# Patient Record
Sex: MALE | Race: WHITE | Employment: UNEMPLOYED | URBAN - METROPOLITAN AREA
[De-identification: names, ages, dates, MRNs, and addresses within clinical notes are randomized per-mention and may not be internally consistent; named-entity substitution may affect disease eponyms.]

---

## 2017-08-17 ENCOUNTER — HOSPITAL ENCOUNTER (INPATIENT)
Age: 62
LOS: 5 days | Discharge: HOME | DRG: 194 | End: 2017-08-22
Attending: EMERGENCY MEDICINE | Admitting: INTERNAL MEDICINE
Payer: MEDICAID

## 2017-08-17 ENCOUNTER — APPOINTMENT (OUTPATIENT)
Dept: CT IMAGING | Age: 62
DRG: 194 | End: 2017-08-17
Payer: MEDICAID

## 2017-08-17 DIAGNOSIS — J90 PLEURAL EFFUSION: Primary | ICD-10-CM

## 2017-08-17 DIAGNOSIS — I50.9 CONGESTIVE HEART FAILURE, UNSPECIFIED CONGESTIVE HEART FAILURE CHRONICITY, UNSPECIFIED CONGESTIVE HEART FAILURE TYPE: ICD-10-CM

## 2017-08-17 DIAGNOSIS — R06.02 SOB (SHORTNESS OF BREATH): ICD-10-CM

## 2017-08-17 DIAGNOSIS — N18.9 CRF (CHRONIC RENAL FAILURE), UNSPECIFIED STAGE: ICD-10-CM

## 2017-08-17 DIAGNOSIS — R09.02 HYPOXEMIA: ICD-10-CM

## 2017-08-17 LAB
ALBUMIN SERPL-MCNC: 3.6 G/DL (ref 3.9–4.9)
ALP BLD-CCNC: 59 U/L (ref 35–104)
ALT SERPL-CCNC: 12 U/L (ref 0–41)
AMORPHOUS: NORMAL
ANION GAP SERPL CALCULATED.3IONS-SCNC: 12 MEQ/L (ref 7–13)
AST SERPL-CCNC: 16 U/L (ref 0–40)
BASOPHILS ABSOLUTE: 0.1 K/UL (ref 0–0.2)
BASOPHILS RELATIVE PERCENT: 0.6 %
BILIRUB SERPL-MCNC: 0.3 MG/DL (ref 0–1.2)
BILIRUBIN URINE: NEGATIVE
BLOOD, URINE: ABNORMAL
BUN BLDV-MCNC: 42 MG/DL (ref 8–23)
CALCIUM SERPL-MCNC: 8.5 MG/DL (ref 8.6–10.2)
CHLORIDE BLD-SCNC: 102 MEQ/L (ref 98–107)
CLARITY: CLEAR
CO2: 22 MEQ/L (ref 22–29)
COLOR: YELLOW
CREAT SERPL-MCNC: 2.02 MG/DL (ref 0.7–1.2)
CRYSTALS, UA: NORMAL
D DIMER: 2.73 MG/L FEU (ref 0–0.5)
EOSINOPHILS ABSOLUTE: 0.1 K/UL (ref 0–0.7)
EOSINOPHILS RELATIVE PERCENT: 0.9 %
EPITHELIAL CELLS, UA: NORMAL /HPF
GFR AFRICAN AMERICAN: 40.7
GFR NON-AFRICAN AMERICAN: 33.6
GLOBULIN: 2.9 G/DL (ref 2.3–3.5)
GLUCOSE BLD-MCNC: 117 MG/DL (ref 60–115)
GLUCOSE BLD-MCNC: 118 MG/DL (ref 60–115)
GLUCOSE BLD-MCNC: 118 MG/DL (ref 74–109)
GLUCOSE URINE: NEGATIVE MG/DL
HCT VFR BLD CALC: 35 % (ref 42–52)
HEMOGLOBIN: 11 G/DL (ref 14–18)
KETONES, URINE: NEGATIVE MG/DL
LEUKOCYTE ESTERASE, URINE: NEGATIVE
LYMPHOCYTES ABSOLUTE: 1.1 K/UL (ref 1–4.8)
LYMPHOCYTES RELATIVE PERCENT: 8.5 %
MCH RBC QN AUTO: 25.7 PG (ref 27–31.3)
MCHC RBC AUTO-ENTMCNC: 31.3 % (ref 33–37)
MCV RBC AUTO: 82.1 FL (ref 80–100)
MONOCYTES ABSOLUTE: 0.6 K/UL (ref 0.2–0.8)
MONOCYTES RELATIVE PERCENT: 4.7 %
NEUTROPHILS ABSOLUTE: 10.9 K/UL (ref 1.4–6.5)
NEUTROPHILS RELATIVE PERCENT: 85.3 %
NITRITE, URINE: NEGATIVE
PDW BLD-RTO: 17.4 % (ref 11.5–14.5)
PERFORMED ON: ABNORMAL
PERFORMED ON: ABNORMAL
PH UA: 5 (ref 5–9)
PLATELET # BLD: 249 K/UL (ref 130–400)
POC CREATININE WHOLE BLOOD: 2.3
POTASSIUM SERPL-SCNC: 4.9 MEQ/L (ref 3.5–5.1)
PRO-BNP: NORMAL PG/ML
PROTEIN UA: >=300 MG/DL
RBC # BLD: 4.27 M/UL (ref 4.7–6.1)
RBC UA: NORMAL /HPF (ref 0–2)
SODIUM BLD-SCNC: 136 MEQ/L (ref 132–144)
SPECIFIC GRAVITY UA: 1.02 (ref 1–1.03)
TOTAL PROTEIN: 6.5 G/DL (ref 6.4–8.1)
TROPONIN: 0.96 NG/ML (ref 0–0.01)
URINE REFLEX TO CULTURE: YES
UROBILINOGEN, URINE: 0.2 E.U./DL
WBC # BLD: 12.7 K/UL (ref 4.8–10.8)
WBC UA: NORMAL /HPF (ref 0–5)

## 2017-08-17 PROCEDURE — 6360000002 HC RX W HCPCS: Performed by: INTERNAL MEDICINE

## 2017-08-17 PROCEDURE — 85025 COMPLETE CBC W/AUTO DIFF WBC: CPT

## 2017-08-17 PROCEDURE — 82948 REAGENT STRIP/BLOOD GLUCOSE: CPT

## 2017-08-17 PROCEDURE — 6370000000 HC RX 637 (ALT 250 FOR IP): Performed by: INTERNAL MEDICINE

## 2017-08-17 PROCEDURE — 84484 ASSAY OF TROPONIN QUANT: CPT

## 2017-08-17 PROCEDURE — 81001 URINALYSIS AUTO W/SCOPE: CPT

## 2017-08-17 PROCEDURE — 96375 TX/PRO/DX INJ NEW DRUG ADDON: CPT

## 2017-08-17 PROCEDURE — 36600 WITHDRAWAL OF ARTERIAL BLOOD: CPT

## 2017-08-17 PROCEDURE — 93005 ELECTROCARDIOGRAM TRACING: CPT

## 2017-08-17 PROCEDURE — 83880 ASSAY OF NATRIURETIC PEPTIDE: CPT

## 2017-08-17 PROCEDURE — 2060000000 HC ICU INTERMEDIATE R&B

## 2017-08-17 PROCEDURE — 82565 ASSAY OF CREATININE: CPT

## 2017-08-17 PROCEDURE — 36415 COLL VENOUS BLD VENIPUNCTURE: CPT

## 2017-08-17 PROCEDURE — 80053 COMPREHEN METABOLIC PANEL: CPT

## 2017-08-17 PROCEDURE — 83605 ASSAY OF LACTIC ACID: CPT

## 2017-08-17 PROCEDURE — 82803 BLOOD GASES ANY COMBINATION: CPT

## 2017-08-17 PROCEDURE — 87086 URINE CULTURE/COLONY COUNT: CPT

## 2017-08-17 PROCEDURE — 6360000002 HC RX W HCPCS: Performed by: EMERGENCY MEDICINE

## 2017-08-17 PROCEDURE — 85379 FIBRIN DEGRADATION QUANT: CPT

## 2017-08-17 PROCEDURE — 2700000000 HC OXYGEN THERAPY PER DAY

## 2017-08-17 PROCEDURE — 96374 THER/PROPH/DIAG INJ IV PUSH: CPT

## 2017-08-17 PROCEDURE — 99285 EMERGENCY DEPT VISIT HI MDM: CPT

## 2017-08-17 PROCEDURE — 71250 CT THORAX DX C-: CPT

## 2017-08-17 PROCEDURE — 2580000003 HC RX 258: Performed by: INTERNAL MEDICINE

## 2017-08-17 RX ORDER — CARVEDILOL 25 MG/1
25 TABLET ORAL 2 TIMES DAILY WITH MEALS
COMMUNITY

## 2017-08-17 RX ORDER — INSULIN GLARGINE 100 [IU]/ML
30 INJECTION, SOLUTION SUBCUTANEOUS NIGHTLY
COMMUNITY

## 2017-08-17 RX ORDER — DEXTROSE MONOHYDRATE 25 G/50ML
12.5 INJECTION, SOLUTION INTRAVENOUS PRN
Status: DISCONTINUED | OUTPATIENT
Start: 2017-08-17 | End: 2017-08-22 | Stop reason: HOSPADM

## 2017-08-17 RX ORDER — HEPARIN SODIUM 1000 [USP'U]/ML
40 INJECTION, SOLUTION INTRAVENOUS; SUBCUTANEOUS PRN
Status: DISCONTINUED | OUTPATIENT
Start: 2017-08-17 | End: 2017-08-17 | Stop reason: SDUPTHER

## 2017-08-17 RX ORDER — HEPARIN SODIUM 5000 [USP'U]/ML
80 INJECTION, SOLUTION INTRAVENOUS; SUBCUTANEOUS ONCE
Status: COMPLETED | OUTPATIENT
Start: 2017-08-17 | End: 2017-08-17

## 2017-08-17 RX ORDER — SODIUM CHLORIDE 0.9 % (FLUSH) 0.9 %
10 SYRINGE (ML) INJECTION PRN
Status: DISCONTINUED | OUTPATIENT
Start: 2017-08-17 | End: 2017-08-22 | Stop reason: HOSPADM

## 2017-08-17 RX ORDER — HEPARIN SODIUM 5000 [USP'U]/ML
80 INJECTION, SOLUTION INTRAVENOUS; SUBCUTANEOUS PRN
Status: DISCONTINUED | OUTPATIENT
Start: 2017-08-17 | End: 2017-08-22 | Stop reason: HOSPADM

## 2017-08-17 RX ORDER — FUROSEMIDE 10 MG/ML
60 INJECTION INTRAMUSCULAR; INTRAVENOUS ONCE
Status: COMPLETED | OUTPATIENT
Start: 2017-08-17 | End: 2017-08-17

## 2017-08-17 RX ORDER — HEPARIN SODIUM 10000 [USP'U]/100ML
18 INJECTION, SOLUTION INTRAVENOUS CONTINUOUS
Status: DISCONTINUED | OUTPATIENT
Start: 2017-08-17 | End: 2017-08-17 | Stop reason: SDUPTHER

## 2017-08-17 RX ORDER — HEPARIN SODIUM 10000 [USP'U]/100ML
18 INJECTION, SOLUTION INTRAVENOUS CONTINUOUS
Status: DISCONTINUED | OUTPATIENT
Start: 2017-08-17 | End: 2017-08-19

## 2017-08-17 RX ORDER — SODIUM CHLORIDE 0.9 % (FLUSH) 0.9 %
10 SYRINGE (ML) INJECTION EVERY 12 HOURS SCHEDULED
Status: DISCONTINUED | OUTPATIENT
Start: 2017-08-17 | End: 2017-08-22 | Stop reason: HOSPADM

## 2017-08-17 RX ORDER — HEPARIN SODIUM 1000 [USP'U]/ML
80 INJECTION, SOLUTION INTRAVENOUS; SUBCUTANEOUS PRN
Status: DISCONTINUED | OUTPATIENT
Start: 2017-08-17 | End: 2017-08-17 | Stop reason: SDUPTHER

## 2017-08-17 RX ORDER — INSULIN GLARGINE 100 [IU]/ML
30 INJECTION, SOLUTION SUBCUTANEOUS NIGHTLY
Status: DISCONTINUED | OUTPATIENT
Start: 2017-08-17 | End: 2017-08-22 | Stop reason: HOSPADM

## 2017-08-17 RX ORDER — LISINOPRIL 20 MG/1
20 TABLET ORAL DAILY
Status: ON HOLD | COMMUNITY
End: 2017-08-21 | Stop reason: HOSPADM

## 2017-08-17 RX ORDER — ASPIRIN 81 MG/1
81 TABLET, CHEWABLE ORAL DAILY
Status: DISCONTINUED | OUTPATIENT
Start: 2017-08-18 | End: 2017-08-22 | Stop reason: HOSPADM

## 2017-08-17 RX ORDER — HEPARIN SODIUM 5000 [USP'U]/ML
40 INJECTION, SOLUTION INTRAVENOUS; SUBCUTANEOUS PRN
Status: DISCONTINUED | OUTPATIENT
Start: 2017-08-17 | End: 2017-08-22 | Stop reason: HOSPADM

## 2017-08-17 RX ORDER — CARVEDILOL 25 MG/1
25 TABLET ORAL 2 TIMES DAILY WITH MEALS
Status: DISCONTINUED | OUTPATIENT
Start: 2017-08-18 | End: 2017-08-17

## 2017-08-17 RX ORDER — ONDANSETRON 2 MG/ML
4 INJECTION INTRAMUSCULAR; INTRAVENOUS EVERY 6 HOURS PRN
Status: DISCONTINUED | OUTPATIENT
Start: 2017-08-17 | End: 2017-08-22 | Stop reason: HOSPADM

## 2017-08-17 RX ORDER — SODIUM CHLORIDE 9 MG/ML
INJECTION, SOLUTION INTRAVENOUS CONTINUOUS
Status: DISCONTINUED | OUTPATIENT
Start: 2017-08-17 | End: 2017-08-17

## 2017-08-17 RX ORDER — HYDROCODONE BITARTRATE AND ACETAMINOPHEN 5; 325 MG/1; MG/1
1 TABLET ORAL EVERY 4 HOURS PRN
Status: DISCONTINUED | OUTPATIENT
Start: 2017-08-17 | End: 2017-08-22 | Stop reason: HOSPADM

## 2017-08-17 RX ORDER — HEPARIN SODIUM 1000 [USP'U]/ML
80 INJECTION, SOLUTION INTRAVENOUS; SUBCUTANEOUS ONCE
Status: DISCONTINUED | OUTPATIENT
Start: 2017-08-17 | End: 2017-08-17 | Stop reason: SDUPTHER

## 2017-08-17 RX ORDER — ACETAMINOPHEN 325 MG/1
650 TABLET ORAL EVERY 4 HOURS PRN
Status: DISCONTINUED | OUTPATIENT
Start: 2017-08-17 | End: 2017-08-22 | Stop reason: HOSPADM

## 2017-08-17 RX ORDER — ATORVASTATIN CALCIUM 20 MG/1
20 TABLET, FILM COATED ORAL NIGHTLY
Status: DISCONTINUED | OUTPATIENT
Start: 2017-08-17 | End: 2017-08-22 | Stop reason: HOSPADM

## 2017-08-17 RX ORDER — CARVEDILOL 25 MG/1
25 TABLET ORAL 2 TIMES DAILY WITH MEALS
Status: DISCONTINUED | OUTPATIENT
Start: 2017-08-17 | End: 2017-08-22 | Stop reason: HOSPADM

## 2017-08-17 RX ORDER — FAMOTIDINE 20 MG/1
20 TABLET, FILM COATED ORAL 2 TIMES DAILY
Status: DISCONTINUED | OUTPATIENT
Start: 2017-08-17 | End: 2017-08-18

## 2017-08-17 RX ADMIN — FUROSEMIDE 5 MG/HR: 10 INJECTION, SOLUTION INTRAMUSCULAR; INTRAVENOUS at 23:41

## 2017-08-17 RX ADMIN — HEPARIN SODIUM 9600 UNITS: 5000 INJECTION, SOLUTION INTRAVENOUS; SUBCUTANEOUS at 20:12

## 2017-08-17 RX ADMIN — ATORVASTATIN CALCIUM 20 MG: 20 TABLET, FILM COATED ORAL at 22:33

## 2017-08-17 RX ADMIN — HEPARIN SODIUM AND DEXTROSE 18 UNITS/KG/HR: 10000; 5 INJECTION INTRAVENOUS at 20:14

## 2017-08-17 RX ADMIN — CARVEDILOL 25 MG: 25 TABLET, FILM COATED ORAL at 22:34

## 2017-08-17 RX ADMIN — INSULIN GLARGINE 30 UNITS: 100 INJECTION, SOLUTION SUBCUTANEOUS at 23:41

## 2017-08-17 RX ADMIN — FUROSEMIDE 60 MG: 10 INJECTION, SOLUTION INTRAVENOUS at 18:23

## 2017-08-17 ASSESSMENT — ENCOUNTER SYMPTOMS
VOMITING: 0
ABDOMINAL PAIN: 0
SHORTNESS OF BREATH: 1
CHEST TIGHTNESS: 0
EYE PAIN: 0
NAUSEA: 0
SORE THROAT: 0

## 2017-08-18 ENCOUNTER — APPOINTMENT (OUTPATIENT)
Dept: GENERAL RADIOLOGY | Age: 62
DRG: 194 | End: 2017-08-18
Payer: MEDICAID

## 2017-08-18 ENCOUNTER — APPOINTMENT (OUTPATIENT)
Dept: ULTRASOUND IMAGING | Age: 62
DRG: 194 | End: 2017-08-18
Payer: MEDICAID

## 2017-08-18 ENCOUNTER — APPOINTMENT (OUTPATIENT)
Dept: NUCLEAR MEDICINE | Age: 62
DRG: 194 | End: 2017-08-18
Payer: MEDICAID

## 2017-08-18 LAB
AMYLASE FLUID: 23 U/L
APPEARANCE FLUID: CLEAR
APTT: 113.6 SEC (ref 21.6–35.4)
APTT: 61.8 SEC (ref 21.6–35.4)
APTT: 67.1 SEC (ref 21.6–35.4)
BASOPHILS ABSOLUTE: 0 K/UL (ref 0–0.2)
BASOPHILS RELATIVE PERCENT: 0.4 %
CELL COUNT FLUID TYPE: NORMAL
CLOT EVALUATION: NORMAL
COLOR FLUID: YELLOW
D DIMER: 2.33 MG/L FEU (ref 0–0.5)
EOSINOPHILS ABSOLUTE: 0.2 K/UL (ref 0–0.7)
EOSINOPHILS RELATIVE PERCENT: 1.4 %
FLUID TYPE: NORMAL
GFR AFRICAN AMERICAN: 35
GFR NON-AFRICAN AMERICAN: 29
GLUCOSE BLD-MCNC: 109 MG/DL (ref 60–115)
GLUCOSE BLD-MCNC: 125 MG/DL (ref 60–115)
GLUCOSE BLD-MCNC: 214 MG/DL (ref 60–115)
GLUCOSE BLD-MCNC: 270 MG/DL (ref 60–115)
GLUCOSE, FLUID: 149.4 MG/DL
HCT VFR BLD CALC: 31.4 % (ref 42–52)
HEMOGLOBIN: 9.8 G/DL (ref 14–18)
LIPASE BODY FLUID: 7 U/L
LYMPHOCYTES ABSOLUTE: 2 K/UL (ref 1–4.8)
LYMPHOCYTES RELATIVE PERCENT: 16.4 %
MAGNESIUM: 2 MG/DL (ref 1.7–2.3)
MCH RBC QN AUTO: 25.8 PG (ref 27–31.3)
MCHC RBC AUTO-ENTMCNC: 31.3 % (ref 33–37)
MCV RBC AUTO: 82.3 FL (ref 80–100)
MONOCYTES ABSOLUTE: 0.7 K/UL (ref 0.2–0.8)
MONOCYTES RELATIVE PERCENT: 6.2 %
NEUTROPHILS ABSOLUTE: 9.1 K/UL (ref 1.4–6.5)
NEUTROPHILS RELATIVE PERCENT: 75.6 %
NUCLEATED CELLS FLUID: 10 /CUMM
NUMBER OF CELLS COUNTED FLUID: 100
PDW BLD-RTO: 16.8 % (ref 11.5–14.5)
PERFORMED ON: ABNORMAL
PERFORMED ON: NORMAL
PLATELET # BLD: 232 K/UL (ref 130–400)
POC CREATININE: 2.3 MG/DL (ref 0.8–1.3)
POC SAMPLE TYPE: ABNORMAL
PROTEIN FLUID: 3 G/DL
RBC # BLD: 3.82 M/UL (ref 4.7–6.1)
RBC FLUID: 60 /CUMM
TROPONIN: 1.11 NG/ML (ref 0–0.01)
TROPONIN: 1.24 NG/ML (ref 0–0.01)
WBC # BLD: 12 K/UL (ref 4.8–10.8)

## 2017-08-18 PROCEDURE — 99223 1ST HOSP IP/OBS HIGH 75: CPT | Performed by: RADIOLOGY

## 2017-08-18 PROCEDURE — 89051 BODY FLUID CELL COUNT: CPT

## 2017-08-18 PROCEDURE — 36415 COLL VENOUS BLD VENIPUNCTURE: CPT

## 2017-08-18 PROCEDURE — 88305 TISSUE EXAM BY PATHOLOGIST: CPT

## 2017-08-18 PROCEDURE — 32555 ASPIRATE PLEURA W/ IMAGING: CPT

## 2017-08-18 PROCEDURE — 84484 ASSAY OF TROPONIN QUANT: CPT

## 2017-08-18 PROCEDURE — 84157 ASSAY OF PROTEIN OTHER: CPT

## 2017-08-18 PROCEDURE — 2580000003 HC RX 258: Performed by: INTERNAL MEDICINE

## 2017-08-18 PROCEDURE — 85025 COMPLETE CBC W/AUTO DIFF WBC: CPT

## 2017-08-18 PROCEDURE — 32555 ASPIRATE PLEURA W/ IMAGING: CPT | Performed by: RADIOLOGY

## 2017-08-18 PROCEDURE — 82945 GLUCOSE OTHER FLUID: CPT

## 2017-08-18 PROCEDURE — 83735 ASSAY OF MAGNESIUM: CPT

## 2017-08-18 PROCEDURE — 0W993ZZ DRAINAGE OF RIGHT PLEURAL CAVITY, PERCUTANEOUS APPROACH: ICD-10-PCS | Performed by: RADIOLOGY

## 2017-08-18 PROCEDURE — 88112 CYTOPATH CELL ENHANCE TECH: CPT

## 2017-08-18 PROCEDURE — A9540 TC99M MAA: HCPCS | Performed by: INTERNAL MEDICINE

## 2017-08-18 PROCEDURE — 87070 CULTURE OTHR SPECIMN AEROBIC: CPT

## 2017-08-18 PROCEDURE — A9539 TC99M PENTETATE: HCPCS | Performed by: INTERNAL MEDICINE

## 2017-08-18 PROCEDURE — 6370000000 HC RX 637 (ALT 250 FOR IP): Performed by: INTERNAL MEDICINE

## 2017-08-18 PROCEDURE — 85730 THROMBOPLASTIN TIME PARTIAL: CPT

## 2017-08-18 PROCEDURE — 2060000000 HC ICU INTERMEDIATE R&B

## 2017-08-18 PROCEDURE — 85379 FIBRIN DEGRADATION QUANT: CPT

## 2017-08-18 PROCEDURE — 71020 XR CHEST STANDARD TWO VW: CPT

## 2017-08-18 PROCEDURE — 3430000000 HC RX DIAGNOSTIC RADIOPHARMACEUTICAL: Performed by: INTERNAL MEDICINE

## 2017-08-18 PROCEDURE — 82150 ASSAY OF AMYLASE: CPT

## 2017-08-18 PROCEDURE — 78582 LUNG VENTILAT&PERFUS IMAGING: CPT

## 2017-08-18 PROCEDURE — 2500000003 HC RX 250 WO HCPCS: Performed by: RADIOLOGY

## 2017-08-18 PROCEDURE — 87205 SMEAR GRAM STAIN: CPT

## 2017-08-18 PROCEDURE — 71020 XR CHEST STANDARD TWO VW: CPT | Performed by: RADIOLOGY

## 2017-08-18 PROCEDURE — 83690 ASSAY OF LIPASE: CPT

## 2017-08-18 RX ORDER — SODIUM CHLORIDE 0.9 % (FLUSH) 0.9 %
10 SYRINGE (ML) INJECTION PRN
Status: DISCONTINUED | OUTPATIENT
Start: 2017-08-18 | End: 2017-08-22 | Stop reason: HOSPADM

## 2017-08-18 RX ORDER — LISINOPRIL 10 MG/1
10 TABLET ORAL DAILY
Status: DISCONTINUED | OUTPATIENT
Start: 2017-08-18 | End: 2017-08-20

## 2017-08-18 RX ORDER — LIDOCAINE HYDROCHLORIDE 20 MG/ML
INJECTION, SOLUTION INFILTRATION; PERINEURAL
Status: COMPLETED | OUTPATIENT
Start: 2017-08-18 | End: 2017-08-18

## 2017-08-18 RX ORDER — FAMOTIDINE 20 MG/1
20 TABLET, FILM COATED ORAL DAILY
Status: DISCONTINUED | OUTPATIENT
Start: 2017-08-19 | End: 2017-08-22 | Stop reason: HOSPADM

## 2017-08-18 RX ORDER — KIT FOR THE PREPARATION OF TECHNETIUM TC 99M PENTETATE 20 MG/1
1 INJECTION, POWDER, LYOPHILIZED, FOR SOLUTION INTRAVENOUS; RESPIRATORY (INHALATION)
Status: COMPLETED | OUTPATIENT
Start: 2017-08-18 | End: 2017-08-18

## 2017-08-18 RX ADMIN — LISINOPRIL 10 MG: 10 TABLET ORAL at 21:37

## 2017-08-18 RX ADMIN — INSULIN GLARGINE 30 UNITS: 100 INJECTION, SOLUTION SUBCUTANEOUS at 21:33

## 2017-08-18 RX ADMIN — KIT FOR THE PREPARATION OF TECHNETIUM TC 99M PENTETATE 1 MILLICURIE: 20 INJECTION, POWDER, LYOPHILIZED, FOR SOLUTION INTRAVENOUS; RESPIRATORY (INHALATION) at 08:20

## 2017-08-18 RX ADMIN — FAMOTIDINE 20 MG: 20 TABLET, FILM COATED ORAL at 00:01

## 2017-08-18 RX ADMIN — LISINOPRIL 10 MG: 10 TABLET ORAL at 17:15

## 2017-08-18 RX ADMIN — CARVEDILOL 25 MG: 25 TABLET, FILM COATED ORAL at 10:00

## 2017-08-18 RX ADMIN — ASPIRIN 81 MG 81 MG: 81 TABLET ORAL at 10:00

## 2017-08-18 RX ADMIN — CARVEDILOL 25 MG: 25 TABLET, FILM COATED ORAL at 21:37

## 2017-08-18 RX ADMIN — CARVEDILOL 25 MG: 25 TABLET, FILM COATED ORAL at 17:15

## 2017-08-18 RX ADMIN — LIDOCAINE HYDROCHLORIDE 5 ML: 20 INJECTION, SOLUTION INFILTRATION; PERINEURAL at 16:13

## 2017-08-18 RX ADMIN — Medication 10 ML: at 08:40

## 2017-08-18 RX ADMIN — SODIUM CHLORIDE, PRESERVATIVE FREE 10 ML: 5 INJECTION INTRAVENOUS at 21:31

## 2017-08-18 RX ADMIN — Medication 5.6 MILLICURIE: at 08:40

## 2017-08-18 ASSESSMENT — ENCOUNTER SYMPTOMS
HEMOPTYSIS: 0
EYES NEGATIVE: 1
BACK PAIN: 0
COUGH: 1
DIARRHEA: 0
GASTROINTESTINAL NEGATIVE: 1
BLURRED VISION: 0
VOMITING: 0
SPUTUM PRODUCTION: 0
DOUBLE VISION: 0
PHOTOPHOBIA: 0
WHEEZING: 1
NAUSEA: 0
HEARTBURN: 0
SHORTNESS OF BREATH: 1
COUGH: 0
CONSTIPATION: 0
ABDOMINAL PAIN: 0

## 2017-08-18 ASSESSMENT — PAIN SCALES - GENERAL
PAINLEVEL_OUTOF10: 0
PAINLEVEL_OUTOF10: 0

## 2017-08-19 ENCOUNTER — APPOINTMENT (OUTPATIENT)
Dept: GENERAL RADIOLOGY | Age: 62
DRG: 194 | End: 2017-08-19
Payer: MEDICAID

## 2017-08-19 LAB
ANION GAP SERPL CALCULATED.3IONS-SCNC: 18 MEQ/L (ref 7–13)
APTT: 48.8 SEC (ref 21.6–35.4)
APTT: 54.6 SEC (ref 21.6–35.4)
BASE EXCESS ARTERIAL: -1 (ref -3–3)
BASOPHILS ABSOLUTE: 0.1 K/UL (ref 0–0.2)
BASOPHILS RELATIVE PERCENT: 0.7 %
BUN BLDV-MCNC: 45 MG/DL (ref 8–23)
C-REACTIVE PROTEIN, HIGH SENSITIVITY: 70.3 MG/L (ref 0–5)
CALCIUM SERPL-MCNC: 8.6 MG/DL (ref 8.6–10.2)
CHLORIDE BLD-SCNC: 99 MEQ/L (ref 98–107)
CO2: 25 MEQ/L (ref 22–29)
CREAT SERPL-MCNC: 2.08 MG/DL (ref 0.7–1.2)
EOSINOPHILS ABSOLUTE: 0.1 K/UL (ref 0–0.7)
EOSINOPHILS RELATIVE PERCENT: 1.5 %
FERRITIN: 752.9 NG/ML (ref 30–400)
GFR AFRICAN AMERICAN: 39.3
GFR NON-AFRICAN AMERICAN: 32.5
GLUCOSE BLD-MCNC: 106 MG/DL (ref 74–109)
GLUCOSE BLD-MCNC: 113 MG/DL (ref 60–115)
GLUCOSE BLD-MCNC: 188 MG/DL (ref 60–115)
GLUCOSE BLD-MCNC: 205 MG/DL (ref 60–115)
GLUCOSE BLD-MCNC: 242 MG/DL (ref 60–115)
GRAM STAIN RESULT: NORMAL
HCO3 ARTERIAL: 24.9 MMOL/L (ref 21–29)
HCT VFR BLD CALC: 34.5 % (ref 42–52)
HEMOGLOBIN: 11.1 G/DL (ref 14–18)
IRON SATURATION: 13 % (ref 11–46)
IRON: 34 UG/DL (ref 59–158)
LACTATE: <0.3 MMOL/L (ref 0.4–2)
LYMPHOCYTES ABSOLUTE: 1.4 K/UL (ref 1–4.8)
LYMPHOCYTES RELATIVE PERCENT: 14 %
MAGNESIUM: 1.9 MG/DL (ref 1.7–2.3)
MCH RBC QN AUTO: 26.1 PG (ref 27–31.3)
MCHC RBC AUTO-ENTMCNC: 32.2 % (ref 33–37)
MCV RBC AUTO: 81 FL (ref 80–100)
MONOCYTES ABSOLUTE: 0.5 K/UL (ref 0.2–0.8)
MONOCYTES RELATIVE PERCENT: 5.6 %
NEUTROPHILS ABSOLUTE: 7.6 K/UL (ref 1.4–6.5)
NEUTROPHILS RELATIVE PERCENT: 78.2 %
O2 SAT, ARTERIAL: 87 % (ref 93–100)
PCO2 ARTERIAL: 47 MM HG (ref 35–45)
PDW BLD-RTO: 17.3 % (ref 11.5–14.5)
PERFORMED ON: ABNORMAL
PERFORMED ON: NORMAL
PH ARTERIAL: 7.33 (ref 7.35–7.45)
PLATELET # BLD: 235 K/UL (ref 130–400)
PO2 ARTERIAL: 57 MM HG (ref 75–108)
POC SAMPLE TYPE: ABNORMAL
POTASSIUM SERPL-SCNC: 4.1 MEQ/L (ref 3.5–5.1)
RBC # BLD: 4.25 M/UL (ref 4.7–6.1)
SEDIMENTATION RATE, ERYTHROCYTE: 90 MM (ref 0–20)
SODIUM BLD-SCNC: 142 MEQ/L (ref 132–144)
TCO2 ARTERIAL: 26 (ref 22–29)
TOTAL IRON BINDING CAPACITY: 257 UG/DL (ref 178–450)
TROPONIN: 1.11 NG/ML (ref 0–0.01)
URINE CULTURE, ROUTINE: NORMAL
WBC # BLD: 9.7 K/UL (ref 4.8–10.8)

## 2017-08-19 PROCEDURE — 87040 BLOOD CULTURE FOR BACTERIA: CPT

## 2017-08-19 PROCEDURE — 2580000003 HC RX 258: Performed by: INTERNAL MEDICINE

## 2017-08-19 PROCEDURE — 6370000000 HC RX 637 (ALT 250 FOR IP): Performed by: INTERNAL MEDICINE

## 2017-08-19 PROCEDURE — 93005 ELECTROCARDIOGRAM TRACING: CPT

## 2017-08-19 PROCEDURE — 6360000002 HC RX W HCPCS: Performed by: INTERNAL MEDICINE

## 2017-08-19 PROCEDURE — 2700000000 HC OXYGEN THERAPY PER DAY

## 2017-08-19 PROCEDURE — 85652 RBC SED RATE AUTOMATED: CPT

## 2017-08-19 PROCEDURE — 83540 ASSAY OF IRON: CPT

## 2017-08-19 PROCEDURE — 85730 THROMBOPLASTIN TIME PARTIAL: CPT

## 2017-08-19 PROCEDURE — 80048 BASIC METABOLIC PNL TOTAL CA: CPT

## 2017-08-19 PROCEDURE — 6360000002 HC RX W HCPCS: Performed by: EMERGENCY MEDICINE

## 2017-08-19 PROCEDURE — 2060000000 HC ICU INTERMEDIATE R&B

## 2017-08-19 PROCEDURE — 85025 COMPLETE CBC W/AUTO DIFF WBC: CPT

## 2017-08-19 PROCEDURE — 82728 ASSAY OF FERRITIN: CPT

## 2017-08-19 PROCEDURE — 71020 XR CHEST STANDARD TWO VW: CPT

## 2017-08-19 PROCEDURE — 36415 COLL VENOUS BLD VENIPUNCTURE: CPT

## 2017-08-19 PROCEDURE — 83550 IRON BINDING TEST: CPT

## 2017-08-19 PROCEDURE — 83735 ASSAY OF MAGNESIUM: CPT

## 2017-08-19 PROCEDURE — 86141 C-REACTIVE PROTEIN HS: CPT

## 2017-08-19 PROCEDURE — 99232 SBSQ HOSP IP/OBS MODERATE 35: CPT | Performed by: INTERNAL MEDICINE

## 2017-08-19 PROCEDURE — 84484 ASSAY OF TROPONIN QUANT: CPT

## 2017-08-19 PROCEDURE — 93017 CV STRESS TEST TRACING ONLY: CPT

## 2017-08-19 PROCEDURE — 93010 ELECTROCARDIOGRAM REPORT: CPT | Performed by: INTERNAL MEDICINE

## 2017-08-19 RX ORDER — HYDRALAZINE HYDROCHLORIDE 25 MG/1
25 TABLET, FILM COATED ORAL EVERY 8 HOURS SCHEDULED
Status: DISCONTINUED | OUTPATIENT
Start: 2017-08-19 | End: 2017-08-21

## 2017-08-19 RX ADMIN — HYDRALAZINE HYDROCHLORIDE 25 MG: 25 TABLET, FILM COATED ORAL at 14:19

## 2017-08-19 RX ADMIN — INSULIN GLARGINE 30 UNITS: 100 INJECTION, SOLUTION SUBCUTANEOUS at 21:07

## 2017-08-19 RX ADMIN — LISINOPRIL 10 MG: 10 TABLET ORAL at 09:06

## 2017-08-19 RX ADMIN — CARVEDILOL 25 MG: 25 TABLET, FILM COATED ORAL at 09:06

## 2017-08-19 RX ADMIN — HEPARIN SODIUM AND DEXTROSE 14.97 UNITS/KG/HR: 10000; 5 INJECTION INTRAVENOUS at 09:08

## 2017-08-19 RX ADMIN — CARVEDILOL 25 MG: 25 TABLET, FILM COATED ORAL at 17:26

## 2017-08-19 RX ADMIN — ASPIRIN 81 MG 81 MG: 81 TABLET ORAL at 09:06

## 2017-08-19 RX ADMIN — FUROSEMIDE 10 MG/HR: 10 INJECTION, SOLUTION INTRAMUSCULAR; INTRAVENOUS at 23:29

## 2017-08-19 ASSESSMENT — PAIN SCALES - GENERAL
PAINLEVEL_OUTOF10: 0

## 2017-08-20 ENCOUNTER — APPOINTMENT (OUTPATIENT)
Dept: GENERAL RADIOLOGY | Age: 62
DRG: 194 | End: 2017-08-20
Payer: MEDICAID

## 2017-08-20 LAB
ANION GAP SERPL CALCULATED.3IONS-SCNC: 16 MEQ/L (ref 7–13)
APTT: 35.8 SEC (ref 21.6–35.4)
BASOPHILS ABSOLUTE: 0 K/UL (ref 0–0.2)
BASOPHILS RELATIVE PERCENT: 0.6 %
BUN BLDV-MCNC: 52 MG/DL (ref 8–23)
CALCIUM SERPL-MCNC: 8 MG/DL (ref 8.6–10.2)
CHLORIDE BLD-SCNC: 99 MEQ/L (ref 98–107)
CO2: 27 MEQ/L (ref 22–29)
CREAT SERPL-MCNC: 2.45 MG/DL (ref 0.7–1.2)
CREATININE URINE: 41.5 MG/DL
EOSINOPHILS ABSOLUTE: 0.3 K/UL (ref 0–0.7)
EOSINOPHILS RELATIVE PERCENT: 3.4 %
GFR AFRICAN AMERICAN: 32.6
GFR NON-AFRICAN AMERICAN: 26.9
GLUCOSE BLD-MCNC: 189 MG/DL (ref 60–115)
GLUCOSE BLD-MCNC: 224 MG/DL (ref 60–115)
GLUCOSE BLD-MCNC: 274 MG/DL (ref 60–115)
GLUCOSE BLD-MCNC: 59 MG/DL (ref 74–109)
GLUCOSE BLD-MCNC: 71 MG/DL (ref 60–115)
HCT VFR BLD CALC: 30.5 % (ref 42–52)
HEMOGLOBIN: 9.9 G/DL (ref 14–18)
LYMPHOCYTES ABSOLUTE: 1.6 K/UL (ref 1–4.8)
LYMPHOCYTES RELATIVE PERCENT: 19.8 %
MCH RBC QN AUTO: 26.1 PG (ref 27–31.3)
MCHC RBC AUTO-ENTMCNC: 32.5 % (ref 33–37)
MCV RBC AUTO: 80.3 FL (ref 80–100)
MONOCYTES ABSOLUTE: 0.6 K/UL (ref 0.2–0.8)
MONOCYTES RELATIVE PERCENT: 7.2 %
NEUTROPHILS ABSOLUTE: 5.7 K/UL (ref 1.4–6.5)
NEUTROPHILS RELATIVE PERCENT: 69 %
PDW BLD-RTO: 17.1 % (ref 11.5–14.5)
PERFORMED ON: ABNORMAL
PERFORMED ON: NORMAL
PLATELET # BLD: 230 K/UL (ref 130–400)
POTASSIUM SERPL-SCNC: 3.6 MEQ/L (ref 3.5–5.1)
PROTEIN PROTEIN: 28 MG/DL
PROTEIN/CREAT RATIO: 0.7 ML/ML (ref 0–0.2)
RBC # BLD: 3.8 M/UL (ref 4.7–6.1)
SODIUM BLD-SCNC: 142 MEQ/L (ref 132–144)
WBC # BLD: 8.3 K/UL (ref 4.8–10.8)

## 2017-08-20 PROCEDURE — 6370000000 HC RX 637 (ALT 250 FOR IP): Performed by: INTERNAL MEDICINE

## 2017-08-20 PROCEDURE — 2580000003 HC RX 258: Performed by: INTERNAL MEDICINE

## 2017-08-20 PROCEDURE — 2700000000 HC OXYGEN THERAPY PER DAY

## 2017-08-20 PROCEDURE — 84156 ASSAY OF PROTEIN URINE: CPT

## 2017-08-20 PROCEDURE — 85730 THROMBOPLASTIN TIME PARTIAL: CPT

## 2017-08-20 PROCEDURE — 36415 COLL VENOUS BLD VENIPUNCTURE: CPT

## 2017-08-20 PROCEDURE — 2060000000 HC ICU INTERMEDIATE R&B

## 2017-08-20 PROCEDURE — 71020 XR CHEST STANDARD TWO VW: CPT

## 2017-08-20 PROCEDURE — 80048 BASIC METABOLIC PNL TOTAL CA: CPT

## 2017-08-20 PROCEDURE — 85025 COMPLETE CBC W/AUTO DIFF WBC: CPT

## 2017-08-20 RX ADMIN — SODIUM CHLORIDE, PRESERVATIVE FREE 10 ML: 5 INJECTION INTRAVENOUS at 21:16

## 2017-08-20 RX ADMIN — HYDRALAZINE HYDROCHLORIDE 25 MG: 25 TABLET, FILM COATED ORAL at 17:31

## 2017-08-20 RX ADMIN — INSULIN GLARGINE 30 UNITS: 100 INJECTION, SOLUTION SUBCUTANEOUS at 21:15

## 2017-08-20 RX ADMIN — SODIUM CHLORIDE, PRESERVATIVE FREE 10 ML: 5 INJECTION INTRAVENOUS at 08:23

## 2017-08-20 RX ADMIN — LISINOPRIL 10 MG: 10 TABLET ORAL at 08:22

## 2017-08-20 RX ADMIN — ASPIRIN 81 MG 81 MG: 81 TABLET ORAL at 08:23

## 2017-08-20 RX ADMIN — CARVEDILOL 25 MG: 25 TABLET, FILM COATED ORAL at 17:31

## 2017-08-20 RX ADMIN — HYDRALAZINE HYDROCHLORIDE 25 MG: 25 TABLET, FILM COATED ORAL at 08:22

## 2017-08-20 RX ADMIN — CARVEDILOL 25 MG: 25 TABLET, FILM COATED ORAL at 08:22

## 2017-08-20 ASSESSMENT — PAIN SCALES - GENERAL
PAINLEVEL_OUTOF10: 0

## 2017-08-21 ENCOUNTER — APPOINTMENT (OUTPATIENT)
Dept: NUCLEAR MEDICINE | Age: 62
DRG: 194 | End: 2017-08-21
Payer: MEDICAID

## 2017-08-21 LAB
ANION GAP SERPL CALCULATED.3IONS-SCNC: 16 MEQ/L (ref 7–13)
APTT: 33.7 SEC (ref 21.6–35.4)
BASOPHILS ABSOLUTE: 0 K/UL (ref 0–0.2)
BASOPHILS RELATIVE PERCENT: 0.6 %
BUN BLDV-MCNC: 52 MG/DL (ref 8–23)
CALCIUM SERPL-MCNC: 7.7 MG/DL (ref 8.6–10.2)
CHLORIDE BLD-SCNC: 99 MEQ/L (ref 98–107)
CO2: 27 MEQ/L (ref 22–29)
CREAT SERPL-MCNC: 2.27 MG/DL (ref 0.7–1.2)
EOSINOPHILS ABSOLUTE: 0.3 K/UL (ref 0–0.7)
EOSINOPHILS RELATIVE PERCENT: 3.9 %
GFR AFRICAN AMERICAN: 35.6
GFR NON-AFRICAN AMERICAN: 29.4
GLUCOSE BLD-MCNC: 104 MG/DL (ref 74–109)
GLUCOSE BLD-MCNC: 137 MG/DL (ref 60–115)
GLUCOSE BLD-MCNC: 186 MG/DL (ref 60–115)
GLUCOSE BLD-MCNC: 217 MG/DL (ref 60–115)
GLUCOSE BLD-MCNC: 93 MG/DL (ref 60–115)
HCT VFR BLD CALC: 31.1 % (ref 42–52)
HEMOGLOBIN: 9.9 G/DL (ref 14–18)
LYMPHOCYTES ABSOLUTE: 1.9 K/UL (ref 1–4.8)
LYMPHOCYTES RELATIVE PERCENT: 23 %
MCH RBC QN AUTO: 25.8 PG (ref 27–31.3)
MCHC RBC AUTO-ENTMCNC: 31.7 % (ref 33–37)
MCV RBC AUTO: 81.4 FL (ref 80–100)
MONOCYTES ABSOLUTE: 0.7 K/UL (ref 0.2–0.8)
MONOCYTES RELATIVE PERCENT: 9.1 %
NEUTROPHILS ABSOLUTE: 5.2 K/UL (ref 1.4–6.5)
NEUTROPHILS RELATIVE PERCENT: 63.4 %
PDW BLD-RTO: 16.8 % (ref 11.5–14.5)
PERFORMED ON: ABNORMAL
PERFORMED ON: NORMAL
PLATELET # BLD: 213 K/UL (ref 130–400)
POTASSIUM SERPL-SCNC: 3.8 MEQ/L (ref 3.5–5.1)
RBC # BLD: 3.82 M/UL (ref 4.7–6.1)
SODIUM BLD-SCNC: 142 MEQ/L (ref 132–144)
WBC # BLD: 8.2 K/UL (ref 4.8–10.8)

## 2017-08-21 PROCEDURE — G8987 SELF CARE CURRENT STATUS: HCPCS

## 2017-08-21 PROCEDURE — 2580000003 HC RX 258: Performed by: INTERNAL MEDICINE

## 2017-08-21 PROCEDURE — G8988 SELF CARE GOAL STATUS: HCPCS

## 2017-08-21 PROCEDURE — 36415 COLL VENOUS BLD VENIPUNCTURE: CPT

## 2017-08-21 PROCEDURE — 6370000000 HC RX 637 (ALT 250 FOR IP): Performed by: INTERNAL MEDICINE

## 2017-08-21 PROCEDURE — 85730 THROMBOPLASTIN TIME PARTIAL: CPT

## 2017-08-21 PROCEDURE — 80048 BASIC METABOLIC PNL TOTAL CA: CPT

## 2017-08-21 PROCEDURE — G8989 SELF CARE D/C STATUS: HCPCS

## 2017-08-21 PROCEDURE — 3430000000 HC RX DIAGNOSTIC RADIOPHARMACEUTICAL: Performed by: INTERNAL MEDICINE

## 2017-08-21 PROCEDURE — 93010 ELECTROCARDIOGRAM REPORT: CPT | Performed by: INTERNAL MEDICINE

## 2017-08-21 PROCEDURE — A9502 TC99M TETROFOSMIN: HCPCS | Performed by: INTERNAL MEDICINE

## 2017-08-21 PROCEDURE — 99232 SBSQ HOSP IP/OBS MODERATE 35: CPT | Performed by: INTERNAL MEDICINE

## 2017-08-21 PROCEDURE — 2060000000 HC ICU INTERMEDIATE R&B

## 2017-08-21 PROCEDURE — 97165 OT EVAL LOW COMPLEX 30 MIN: CPT

## 2017-08-21 PROCEDURE — 78453 HT MUSCLE IMAGE PLANAR SING: CPT

## 2017-08-21 PROCEDURE — 85025 COMPLETE CBC W/AUTO DIFF WBC: CPT

## 2017-08-21 RX ORDER — TORSEMIDE 20 MG/1
20 TABLET ORAL DAILY
Status: DISCONTINUED | OUTPATIENT
Start: 2017-08-21 | End: 2017-08-22 | Stop reason: HOSPADM

## 2017-08-21 RX ORDER — LISINOPRIL 10 MG/1
10 TABLET ORAL DAILY
Qty: 30 TABLET | Refills: 3 | Status: SHIPPED | OUTPATIENT
Start: 2017-08-21

## 2017-08-21 RX ORDER — ISOSORBIDE MONONITRATE 60 MG/1
60 TABLET, EXTENDED RELEASE ORAL DAILY
Status: DISCONTINUED | OUTPATIENT
Start: 2017-08-21 | End: 2017-08-22 | Stop reason: HOSPADM

## 2017-08-21 RX ORDER — TORSEMIDE 20 MG/1
20 TABLET ORAL DAILY
Qty: 30 TABLET | Refills: 3 | Status: SHIPPED | OUTPATIENT
Start: 2017-08-21

## 2017-08-21 RX ORDER — LISINOPRIL 10 MG/1
10 TABLET ORAL DAILY
Status: DISCONTINUED | OUTPATIENT
Start: 2017-08-21 | End: 2017-08-22 | Stop reason: HOSPADM

## 2017-08-21 RX ORDER — SODIUM CHLORIDE 0.9 % (FLUSH) 0.9 %
10 SYRINGE (ML) INJECTION PRN
Status: DISCONTINUED | OUTPATIENT
Start: 2017-08-21 | End: 2017-08-22 | Stop reason: HOSPADM

## 2017-08-21 RX ORDER — HYDRALAZINE HYDROCHLORIDE 50 MG/1
50 TABLET, FILM COATED ORAL 2 TIMES DAILY
Status: DISCONTINUED | OUTPATIENT
Start: 2017-08-21 | End: 2017-08-22 | Stop reason: HOSPADM

## 2017-08-21 RX ADMIN — INSULIN GLARGINE 30 UNITS: 100 INJECTION, SOLUTION SUBCUTANEOUS at 22:25

## 2017-08-21 RX ADMIN — ISOSORBIDE MONONITRATE 60 MG: 60 TABLET, EXTENDED RELEASE ORAL at 14:57

## 2017-08-21 RX ADMIN — Medication 10 ML: at 12:05

## 2017-08-21 RX ADMIN — HYDRALAZINE HYDROCHLORIDE 25 MG: 25 TABLET, FILM COATED ORAL at 05:27

## 2017-08-21 RX ADMIN — LISINOPRIL 10 MG: 10 TABLET ORAL at 14:57

## 2017-08-21 RX ADMIN — CARVEDILOL 25 MG: 25 TABLET, FILM COATED ORAL at 17:18

## 2017-08-21 RX ADMIN — TETROFOSMIN 35.6 MILLICURIE: 0.23 INJECTION, POWDER, LYOPHILIZED, FOR SOLUTION INTRAVENOUS at 12:05

## 2017-08-21 RX ADMIN — SODIUM CHLORIDE, PRESERVATIVE FREE 10 ML: 5 INJECTION INTRAVENOUS at 22:06

## 2017-08-21 RX ADMIN — HYDRALAZINE HYDROCHLORIDE 50 MG: 50 TABLET ORAL at 22:03

## 2017-08-21 RX ADMIN — CARVEDILOL 25 MG: 25 TABLET, FILM COATED ORAL at 09:54

## 2017-08-21 RX ADMIN — ASPIRIN 81 MG 81 MG: 81 TABLET ORAL at 09:54

## 2017-08-21 RX ADMIN — SODIUM CHLORIDE, PRESERVATIVE FREE 10 ML: 5 INJECTION INTRAVENOUS at 09:55

## 2017-08-21 RX ADMIN — TORSEMIDE 20 MG: 20 TABLET ORAL at 14:56

## 2017-08-21 ASSESSMENT — PAIN SCALES - GENERAL
PAINLEVEL_OUTOF10: 0

## 2017-08-22 ENCOUNTER — APPOINTMENT (OUTPATIENT)
Dept: GENERAL RADIOLOGY | Age: 62
DRG: 194 | End: 2017-08-22
Payer: MEDICAID

## 2017-08-22 VITALS
BODY MASS INDEX: 37.94 KG/M2 | HEART RATE: 67 BPM | TEMPERATURE: 97.9 F | WEIGHT: 265 LBS | DIASTOLIC BLOOD PRESSURE: 71 MMHG | RESPIRATION RATE: 18 BRPM | HEIGHT: 70 IN | OXYGEN SATURATION: 97 % | SYSTOLIC BLOOD PRESSURE: 148 MMHG

## 2017-08-22 PROBLEM — E11.9 DM (DIABETES MELLITUS) (HCC): Status: ACTIVE | Noted: 2017-08-22

## 2017-08-22 PROBLEM — I10 HTN (HYPERTENSION): Status: ACTIVE | Noted: 2017-08-22

## 2017-08-22 PROBLEM — R06.00 DYSPNEA: Status: ACTIVE | Noted: 2017-08-22

## 2017-08-22 PROBLEM — I50.9 CHF (CONGESTIVE HEART FAILURE) (HCC): Status: ACTIVE | Noted: 2017-08-22

## 2017-08-22 LAB
ANION GAP SERPL CALCULATED.3IONS-SCNC: 16 MEQ/L (ref 7–13)
APTT: 33.1 SEC (ref 21.6–35.4)
BODY FLUID CULTURE, STERILE: NORMAL
BUN BLDV-MCNC: 51 MG/DL (ref 8–23)
CALCIUM SERPL-MCNC: 7.8 MG/DL (ref 8.6–10.2)
CHLORIDE BLD-SCNC: 104 MEQ/L (ref 98–107)
CK MB: 2.9 NG/ML (ref 0–6.7)
CO2: 25 MEQ/L (ref 22–29)
CREAT SERPL-MCNC: 2.01 MG/DL (ref 0.7–1.2)
CREATINE KINASE-MB INDEX: 2.2 % (ref 0–3.5)
GFR AFRICAN AMERICAN: 40.9
GFR NON-AFRICAN AMERICAN: 33.8
GLUCOSE BLD-MCNC: 113 MG/DL (ref 74–109)
GLUCOSE BLD-MCNC: 119 MG/DL (ref 60–115)
GLUCOSE BLD-MCNC: 144 MG/DL (ref 60–115)
PERFORMED ON: ABNORMAL
PERFORMED ON: ABNORMAL
POTASSIUM SERPL-SCNC: 4.1 MEQ/L (ref 3.5–5.1)
SODIUM BLD-SCNC: 145 MEQ/L (ref 132–144)
TOTAL CK: 133 U/L (ref 0–190)
TROPONIN: 0.15 NG/ML (ref 0–0.01)

## 2017-08-22 PROCEDURE — 80048 BASIC METABOLIC PNL TOTAL CA: CPT

## 2017-08-22 PROCEDURE — 71020 XR CHEST STANDARD TWO VW: CPT

## 2017-08-22 PROCEDURE — 6370000000 HC RX 637 (ALT 250 FOR IP): Performed by: INTERNAL MEDICINE

## 2017-08-22 PROCEDURE — 84484 ASSAY OF TROPONIN QUANT: CPT

## 2017-08-22 PROCEDURE — 82553 CREATINE MB FRACTION: CPT

## 2017-08-22 PROCEDURE — 93005 ELECTROCARDIOGRAM TRACING: CPT

## 2017-08-22 PROCEDURE — 36415 COLL VENOUS BLD VENIPUNCTURE: CPT

## 2017-08-22 PROCEDURE — 2580000003 HC RX 258: Performed by: INTERNAL MEDICINE

## 2017-08-22 PROCEDURE — 99232 SBSQ HOSP IP/OBS MODERATE 35: CPT | Performed by: INTERNAL MEDICINE

## 2017-08-22 PROCEDURE — 82550 ASSAY OF CK (CPK): CPT

## 2017-08-22 PROCEDURE — 85730 THROMBOPLASTIN TIME PARTIAL: CPT

## 2017-08-22 RX ORDER — CLOPIDOGREL BISULFATE 75 MG/1
75 TABLET ORAL DAILY
Status: DISCONTINUED | OUTPATIENT
Start: 2017-08-22 | End: 2017-08-22 | Stop reason: HOSPADM

## 2017-08-22 RX ORDER — ATORVASTATIN CALCIUM 20 MG/1
20 TABLET, FILM COATED ORAL NIGHTLY
Qty: 30 TABLET | Refills: 3 | Status: SHIPPED | OUTPATIENT
Start: 2017-08-22

## 2017-08-22 RX ORDER — CLOPIDOGREL BISULFATE 75 MG/1
75 TABLET ORAL DAILY
Qty: 30 TABLET | Refills: 3 | Status: SHIPPED | OUTPATIENT
Start: 2017-08-22

## 2017-08-22 RX ORDER — ASPIRIN 81 MG/1
81 TABLET, CHEWABLE ORAL DAILY
Qty: 30 TABLET | Refills: 3 | Status: SHIPPED | OUTPATIENT
Start: 2017-08-22

## 2017-08-22 RX ADMIN — CARVEDILOL 25 MG: 25 TABLET, FILM COATED ORAL at 08:58

## 2017-08-22 RX ADMIN — ISOSORBIDE MONONITRATE 60 MG: 60 TABLET, EXTENDED RELEASE ORAL at 08:58

## 2017-08-22 RX ADMIN — LISINOPRIL 10 MG: 10 TABLET ORAL at 08:58

## 2017-08-22 RX ADMIN — HYDRALAZINE HYDROCHLORIDE 50 MG: 50 TABLET ORAL at 08:58

## 2017-08-22 RX ADMIN — TORSEMIDE 20 MG: 20 TABLET ORAL at 08:58

## 2017-08-22 RX ADMIN — SODIUM CHLORIDE, PRESERVATIVE FREE 10 ML: 5 INJECTION INTRAVENOUS at 09:00

## 2017-08-22 RX ADMIN — ASPIRIN 81 MG 81 MG: 81 TABLET ORAL at 08:58

## 2017-08-23 ENCOUNTER — CARE COORDINATION (OUTPATIENT)
Dept: CASE MANAGEMENT | Age: 62
End: 2017-08-23

## 2017-08-23 LAB
EKG ATRIAL RATE: 64 BPM
EKG ATRIAL RATE: 64 BPM
EKG ATRIAL RATE: 76 BPM
EKG P AXIS: 0 DEGREES
EKG P AXIS: 1 DEGREES
EKG P AXIS: 19 DEGREES
EKG P-R INTERVAL: 146 MS
EKG P-R INTERVAL: 152 MS
EKG P-R INTERVAL: 184 MS
EKG Q-T INTERVAL: 430 MS
EKG Q-T INTERVAL: 444 MS
EKG Q-T INTERVAL: 460 MS
EKG QRS DURATION: 100 MS
EKG QRS DURATION: 100 MS
EKG QRS DURATION: 82 MS
EKG QTC CALCULATION (BAZETT): 458 MS
EKG QTC CALCULATION (BAZETT): 474 MS
EKG QTC CALCULATION (BAZETT): 483 MS
EKG R AXIS: 0 DEGREES
EKG R AXIS: 1 DEGREES
EKG R AXIS: 6 DEGREES
EKG T AXIS: -14 DEGREES
EKG T AXIS: 2 DEGREES
EKG T AXIS: 35 DEGREES
EKG VENTRICULAR RATE: 64 BPM
EKG VENTRICULAR RATE: 64 BPM
EKG VENTRICULAR RATE: 76 BPM

## 2017-08-24 LAB
BLOOD CULTURE, ROUTINE: NORMAL
CULTURE, BLOOD 2: NORMAL

## 2017-09-06 ENCOUNTER — CARE COORDINATION (OUTPATIENT)
Dept: CASE MANAGEMENT | Age: 62
End: 2017-09-06

## 2017-09-21 ENCOUNTER — CARE COORDINATION (OUTPATIENT)
Dept: CASE MANAGEMENT | Age: 62
End: 2017-09-21

## 2024-03-20 ENCOUNTER — DOCUMENTATION (OUTPATIENT)
Dept: TRANSPLANT | Facility: HOSPITAL | Age: 69
End: 2024-03-20
Payer: MEDICARE

## 2024-03-20 ENCOUNTER — TELEPHONE (OUTPATIENT)
Dept: TRANSPLANT | Facility: HOSPITAL | Age: 69
End: 2024-03-20
Payer: MEDICARE

## 2024-03-20 VITALS — WEIGHT: 235.89 LBS | HEIGHT: 70 IN | BODY MASS INDEX: 33.77 KG/M2

## 2024-03-20 DIAGNOSIS — N18.6 ESRD (END STAGE RENAL DISEASE) (MULTI): Primary | ICD-10-CM

## 2024-03-20 NOTE — PROGRESS NOTES
Do you have difficulty reading or writing in English?   no   What is the primary cause of your kidney disease?  Diabetes  Are you currently on dialysis?   yes  If yes, what days do you have your dialysis treatments?  M, W, F  Have you received a transplant before?   no  If yes, what organ, and when and where was your transplant?    Have you been diagnosed with diabetes?    yes  Have you tested positive for hepatitis or HIV?   no  Have you ever been diagnosed with cancer?   no  If yes, what type of cancer, and when and where were you treated?   Do you have a history of a heart attack or stroke? No  Are you currently or have you previously been seen by a mental health professional?   no  If yes, what is the name of your mental health provider?   Are you a current or former tobacco user?   no  Do you have history of alcohol abuse or dependence?   no  Do you have a history of illegal drug abuse or dependence?   no  Has anyone told you they're willing to donate their kidney to you?   no  Comments: Intake complete, scheduled eval appt.

## 2024-04-07 ENCOUNTER — APPOINTMENT (OUTPATIENT)
Dept: CARDIOLOGY | Facility: HOSPITAL | Age: 69
End: 2024-04-07
Payer: MEDICARE

## 2024-04-07 ENCOUNTER — HOSPITAL ENCOUNTER (EMERGENCY)
Facility: HOSPITAL | Age: 69
Discharge: LEFT BEFORE DISCHARGE | End: 2024-04-07
Attending: EMERGENCY MEDICINE
Payer: MEDICARE

## 2024-04-07 ENCOUNTER — APPOINTMENT (OUTPATIENT)
Dept: RADIOLOGY | Facility: HOSPITAL | Age: 69
End: 2024-04-07
Payer: MEDICARE

## 2024-04-07 VITALS
SYSTOLIC BLOOD PRESSURE: 141 MMHG | WEIGHT: 238.1 LBS | RESPIRATION RATE: 18 BRPM | DIASTOLIC BLOOD PRESSURE: 66 MMHG | OXYGEN SATURATION: 100 % | HEIGHT: 70 IN | TEMPERATURE: 98.1 F | HEART RATE: 70 BPM | BODY MASS INDEX: 34.09 KG/M2

## 2024-04-07 DIAGNOSIS — R00.0 TACHYARRHYTHMIA: Primary | ICD-10-CM

## 2024-04-07 DIAGNOSIS — J18.9 PNEUMONIA OF BOTH LUNGS DUE TO INFECTIOUS ORGANISM, UNSPECIFIED PART OF LUNG: ICD-10-CM

## 2024-04-07 DIAGNOSIS — E87.6 HYPOKALEMIA: ICD-10-CM

## 2024-04-07 DIAGNOSIS — R94.31 QT PROLONGATION: ICD-10-CM

## 2024-04-07 LAB
ALBUMIN SERPL BCP-MCNC: 4 G/DL (ref 3.4–5)
ALP SERPL-CCNC: 62 U/L (ref 33–136)
ALT SERPL W P-5'-P-CCNC: 9 U/L (ref 10–52)
ANION GAP SERPL CALC-SCNC: 13 MMOL/L (ref 10–20)
APTT PPP: 39 SECONDS (ref 27–38)
AST SERPL W P-5'-P-CCNC: 14 U/L (ref 9–39)
BASOPHILS # BLD AUTO: 0.03 X10*3/UL (ref 0–0.1)
BASOPHILS NFR BLD AUTO: 0.5 %
BILIRUB SERPL-MCNC: 0.5 MG/DL (ref 0–1.2)
BNP SERPL-MCNC: 248 PG/ML (ref 0–99)
BUN SERPL-MCNC: 34 MG/DL (ref 6–23)
CALCIUM SERPL-MCNC: 8.7 MG/DL (ref 8.6–10.3)
CARDIAC TROPONIN I PNL SERPL HS: 18 NG/L (ref 0–20)
CARDIAC TROPONIN I PNL SERPL HS: 19 NG/L (ref 0–20)
CHLORIDE SERPL-SCNC: 95 MMOL/L (ref 98–107)
CO2 SERPL-SCNC: 33 MMOL/L (ref 21–32)
CREAT SERPL-MCNC: 4.12 MG/DL (ref 0.5–1.3)
D DIMER PPP FEU-MCNC: 891 NG/ML FEU
EGFRCR SERPLBLD CKD-EPI 2021: 15 ML/MIN/1.73M*2
EOSINOPHIL # BLD AUTO: 0.14 X10*3/UL (ref 0–0.7)
EOSINOPHIL NFR BLD AUTO: 2.2 %
ERYTHROCYTE [DISTWIDTH] IN BLOOD BY AUTOMATED COUNT: 15.9 % (ref 11.5–14.5)
FLUAV RNA RESP QL NAA+PROBE: NOT DETECTED
FLUBV RNA RESP QL NAA+PROBE: NOT DETECTED
GLUCOSE SERPL-MCNC: 151 MG/DL (ref 74–99)
HCT VFR BLD AUTO: 30.9 % (ref 41–52)
HGB BLD-MCNC: 10.2 G/DL (ref 13.5–17.5)
IMM GRANULOCYTES # BLD AUTO: 0.02 X10*3/UL (ref 0–0.7)
IMM GRANULOCYTES NFR BLD AUTO: 0.3 % (ref 0–0.9)
INR PPP: 1 (ref 0.9–1.1)
LYMPHOCYTES # BLD AUTO: 1.62 X10*3/UL (ref 1.2–4.8)
LYMPHOCYTES NFR BLD AUTO: 25.8 %
MAGNESIUM SERPL-MCNC: 1.86 MG/DL (ref 1.6–2.4)
MCH RBC QN AUTO: 31.9 PG (ref 26–34)
MCHC RBC AUTO-ENTMCNC: 33 G/DL (ref 32–36)
MCV RBC AUTO: 97 FL (ref 80–100)
MONOCYTES # BLD AUTO: 0.66 X10*3/UL (ref 0.1–1)
MONOCYTES NFR BLD AUTO: 10.5 %
NEUTROPHILS # BLD AUTO: 3.82 X10*3/UL (ref 1.2–7.7)
NEUTROPHILS NFR BLD AUTO: 60.7 %
NRBC BLD-RTO: 0 /100 WBCS (ref 0–0)
PLATELET # BLD AUTO: 179 X10*3/UL (ref 150–450)
POTASSIUM SERPL-SCNC: 3.2 MMOL/L (ref 3.5–5.3)
PROT SERPL-MCNC: 7 G/DL (ref 6.4–8.2)
PROTHROMBIN TIME: 11 SECONDS (ref 9.8–12.8)
RBC # BLD AUTO: 3.2 X10*6/UL (ref 4.5–5.9)
SARS-COV-2 RNA RESP QL NAA+PROBE: NOT DETECTED
SODIUM SERPL-SCNC: 138 MMOL/L (ref 136–145)
WBC # BLD AUTO: 6.3 X10*3/UL (ref 4.4–11.3)

## 2024-04-07 PROCEDURE — 2550000001 HC RX 255 CONTRASTS: Performed by: EMERGENCY MEDICINE

## 2024-04-07 PROCEDURE — 85730 THROMBOPLASTIN TIME PARTIAL: CPT | Performed by: EMERGENCY MEDICINE

## 2024-04-07 PROCEDURE — 85610 PROTHROMBIN TIME: CPT | Performed by: EMERGENCY MEDICINE

## 2024-04-07 PROCEDURE — 2500000002 HC RX 250 W HCPCS SELF ADMINISTERED DRUGS (ALT 637 FOR MEDICARE OP, ALT 636 FOR OP/ED): Mod: MUE | Performed by: EMERGENCY MEDICINE

## 2024-04-07 PROCEDURE — 93005 ELECTROCARDIOGRAM TRACING: CPT

## 2024-04-07 PROCEDURE — 84484 ASSAY OF TROPONIN QUANT: CPT | Performed by: EMERGENCY MEDICINE

## 2024-04-07 PROCEDURE — 71275 CT ANGIOGRAPHY CHEST: CPT

## 2024-04-07 PROCEDURE — 80053 COMPREHEN METABOLIC PANEL: CPT | Performed by: EMERGENCY MEDICINE

## 2024-04-07 PROCEDURE — 71045 X-RAY EXAM CHEST 1 VIEW: CPT | Performed by: RADIOLOGY

## 2024-04-07 PROCEDURE — 85025 COMPLETE CBC W/AUTO DIFF WBC: CPT | Performed by: EMERGENCY MEDICINE

## 2024-04-07 PROCEDURE — 36415 COLL VENOUS BLD VENIPUNCTURE: CPT | Performed by: EMERGENCY MEDICINE

## 2024-04-07 PROCEDURE — 87636 SARSCOV2 & INF A&B AMP PRB: CPT | Performed by: EMERGENCY MEDICINE

## 2024-04-07 PROCEDURE — 83880 ASSAY OF NATRIURETIC PEPTIDE: CPT | Performed by: EMERGENCY MEDICINE

## 2024-04-07 PROCEDURE — 2500000001 HC RX 250 WO HCPCS SELF ADMINISTERED DRUGS (ALT 637 FOR MEDICARE OP): Performed by: EMERGENCY MEDICINE

## 2024-04-07 PROCEDURE — 99291 CRITICAL CARE FIRST HOUR: CPT | Performed by: EMERGENCY MEDICINE

## 2024-04-07 PROCEDURE — 71045 X-RAY EXAM CHEST 1 VIEW: CPT

## 2024-04-07 PROCEDURE — 83735 ASSAY OF MAGNESIUM: CPT | Performed by: EMERGENCY MEDICINE

## 2024-04-07 PROCEDURE — 71275 CT ANGIOGRAPHY CHEST: CPT | Performed by: RADIOLOGY

## 2024-04-07 PROCEDURE — 85379 FIBRIN DEGRADATION QUANT: CPT | Performed by: EMERGENCY MEDICINE

## 2024-04-07 PROCEDURE — 99285 EMERGENCY DEPT VISIT HI MDM: CPT | Mod: 25

## 2024-04-07 RX ORDER — DOXYCYCLINE 100 MG/1
100 CAPSULE ORAL 2 TIMES DAILY
Qty: 20 CAPSULE | Refills: 0 | Status: SHIPPED | OUTPATIENT
Start: 2024-04-07 | End: 2024-04-17

## 2024-04-07 RX ORDER — POTASSIUM CHLORIDE 750 MG/1
10 TABLET, FILM COATED, EXTENDED RELEASE ORAL ONCE
Status: COMPLETED | OUTPATIENT
Start: 2024-04-07 | End: 2024-04-07

## 2024-04-07 RX ORDER — DOXYCYCLINE HYCLATE 100 MG
100 TABLET ORAL ONCE
Status: COMPLETED | OUTPATIENT
Start: 2024-04-07 | End: 2024-04-07

## 2024-04-07 RX ADMIN — POTASSIUM CHLORIDE 10 MEQ: 750 TABLET, EXTENDED RELEASE ORAL at 17:25

## 2024-04-07 RX ADMIN — DOXYCYCLINE HYCLATE 100 MG: 100 TABLET, FILM COATED ORAL at 17:25

## 2024-04-07 RX ADMIN — IOHEXOL 75 ML: 350 INJECTION, SOLUTION INTRAVENOUS at 16:14

## 2024-04-07 ASSESSMENT — LIFESTYLE VARIABLES
EVER FELT BAD OR GUILTY ABOUT YOUR DRINKING: NO
HAVE YOU EVER FELT YOU SHOULD CUT DOWN ON YOUR DRINKING: NO
HAVE PEOPLE ANNOYED YOU BY CRITICIZING YOUR DRINKING: NO
TOTAL SCORE: 0
EVER HAD A DRINK FIRST THING IN THE MORNING TO STEADY YOUR NERVES TO GET RID OF A HANGOVER: NO

## 2024-04-07 ASSESSMENT — PAIN - FUNCTIONAL ASSESSMENT: PAIN_FUNCTIONAL_ASSESSMENT: 0-10

## 2024-04-07 ASSESSMENT — COLUMBIA-SUICIDE SEVERITY RATING SCALE - C-SSRS
6. HAVE YOU EVER DONE ANYTHING, STARTED TO DO ANYTHING, OR PREPARED TO DO ANYTHING TO END YOUR LIFE?: NO
2. HAVE YOU ACTUALLY HAD ANY THOUGHTS OF KILLING YOURSELF?: NO
1. IN THE PAST MONTH, HAVE YOU WISHED YOU WERE DEAD OR WISHED YOU COULD GO TO SLEEP AND NOT WAKE UP?: NO

## 2024-04-07 ASSESSMENT — PAIN SCALES - GENERAL: PAINLEVEL_OUTOF10: 0 - NO PAIN

## 2024-04-07 NOTE — ED PROVIDER NOTES
68-year-old male presents emergency department via EMS with reports of tachyarrhythmia and altered level of consciousness as well as low blood pressure.  EMS report that they were called by the dialysis unit because while getting dialysis patient's heart rate went up to 150s 160s and he became less responsive.  Reportedly has a history of atrial fibrillation.  EMS states that when they arrived the patient's heart rate was in the 150s and he appeared pale and diaphoretic.  They state that he was somewhat confused as well and less responsive.  EMS states they obtained EKG that they believe showed SVT.  Patient was then synchronized cardioverted at 100 J without response and then 200 J with conversion to sinus rhythm.  He states that after patient converted to sinus rhythm he had low blood pressures in the 70s and they even gave the patient IV fluids.  EMS does state that they gave the patient 5 of Versed for cardioversion and they thought that may be contributing to his decreased responsiveness.  Patient reports that he does remember feeling short of breath and like his heart was racing, but does not recall the rest of the events.  He states he has had similar episodes when he is getting dialysis.  Patient tells me that he gets dialysis Monday Wednesday Friday, but his dialysis was moved up today because of the clips.  No reported fevers, coughing, or congestion.  Patient denies any chest pain.  He reports feeling generally tired.  No current shortness of breath, abdominal pain, nausea, vomiting, diarrhea, constipation, black or bloody stools.  Patient is not currently on any anticoagulants.  No reported head injury or trauma. Chart review shows significant past medical history for end-stage renal disease states he does not make much urine, cardiomegaly, diabetes, hypertension, atrial flutter, CAD, CHF, GERD, hyperlipidemia, elevated BMI, and sleep apnea.  Patient is on Brilinta.      History provided by:  Patient and  EMS personnel  History limited by:  Mental status change   used: No           ------------------------------------------------------------------------------------------------------------------------------------------    VS: As documented in the triage note and EMR flowsheet from this visit were reviewed.    Review of Systems  Review of systems is somewhat limited due to the patient's amnesia to events.  Constitutional: no fever, chills reports malaise and fatigue  Eyes: no redness, discharge, pain  HENT: no sore throat, nose bleeds, congestion, rhinorrhea   Cardiovascular: no chest pain, leg edema, admits to palpitations  Respiratory: Reports shortness of breath during episode no cough, wheezing  GI: no nausea, diarrhea, pain, vomiting, constipation, BRBPR, melena  : Patient has end-stage renal disease states he does not make much urine  Musculoskeletal: no neck pain, stiffness,  no joint deformity, swelling  Skin: no rash, erythema, wounds  Neurological: no headache, dizziness, weakness, numbness, or tingling admits to feeling lightheaded during episode  Psychiatric: no suicidal thoughts,agitation reported confusion/altered mental status during episode  Metabolic: no polyuria or polydipsia  Hematologic: Patient is on Brilinta  Immunology: No immunocompromise state    [facility]  Nursing notes reviewed and confirmed by me.  Chart review performed including medications, allergies, and medical, surgical, and family history  Visit Vitals  /66 (BP Location: Right arm, Patient Position: Lying)   Pulse 70   Temp 36.7 °C (98.1 °F) (Temporal)   Resp 18     Physical Exam  Vitals and nursing note reviewed.   Constitutional:       General: He is not in acute distress.     Appearance: Normal appearance. He is ill-appearing (Chronically ill in appearance).   HENT:      Head: Normocephalic and atraumatic.      Right Ear: External ear normal.      Left Ear: External ear normal.      Nose: Nose normal. No  congestion or rhinorrhea.      Mouth/Throat:      Mouth: Mucous membranes are dry.      Pharynx: No oropharyngeal exudate or posterior oropharyngeal erythema.   Eyes:      Extraocular Movements: Extraocular movements intact.      Conjunctiva/sclera: Conjunctivae normal.      Pupils: Pupils are equal, round, and reactive to light.   Cardiovascular:      Rate and Rhythm: Normal rate and regular rhythm.      Pulses: Normal pulses.      Heart sounds: Normal heart sounds.   Pulmonary:      Effort: Pulmonary effort is normal. No respiratory distress.      Breath sounds: No stridor. No wheezing, rhonchi or rales.      Comments: Coarse breath sounds bilaterally diminished in the bases  Abdominal:      General: There is no distension.      Palpations: Abdomen is soft.      Tenderness: There is no abdominal tenderness. There is no guarding or rebound.   Musculoskeletal:         General: No swelling or deformity. Normal range of motion.      Cervical back: Normal range of motion and neck supple. No rigidity.      Right lower leg: Edema present.      Left lower leg: Edema present.      Comments: No calf tenderness   Trace lower extremity edema bilaterally   Skin:     General: Skin is warm and dry.      Capillary Refill: Capillary refill takes less than 2 seconds.      Coloration: Skin is not jaundiced.      Findings: No rash.   Neurological:      General: No focal deficit present.      Mental Status: He is alert and oriented to person, place, and time.      Sensory: No sensory deficit.      Motor: No weakness.      Comments: Cranial nerves II through XII grossly intact.  NIH stroke scale is 0.   Psychiatric:         Mood and Affect: Mood normal.         Behavior: Behavior normal.        No past medical history on file.   Past Surgical History:   Procedure Laterality Date    OTHER SURGICAL HISTORY  11/21/2019    Toe amputation      Social History     Socioeconomic History    Marital status: Single     Spouse name: Not on file     Number of children: Not on file    Years of education: Not on file    Highest education level: Not on file   Occupational History    Not on file   Tobacco Use    Smoking status: Not on file    Smokeless tobacco: Not on file   Substance and Sexual Activity    Alcohol use: Not on file    Drug use: Not on file    Sexual activity: Not on file   Other Topics Concern    Not on file   Social History Narrative    Not on file     Social Determinants of Health     Financial Resource Strain: Not on file   Food Insecurity: Not on file   Transportation Needs: Not on file   Physical Activity: Not on file   Stress: Not on file   Social Connections: Not on file   Intimate Partner Violence: Not on file   Housing Stability: Not on file      ------------------------------------------------------------------------------------------------------------------------------------------  XR chest 1 view   Final Result   1.  Stable cardiomegaly.   2. Small irregular bibasilar opacities, left greater than right, may   be due to atelectasis and or infiltrates.                  MACRO:   None.        Signed by: Fabian Arboleda 4/7/2024 5:02 PM   Dictation workstation:   [id number]      CT angio chest for pulmonary embolism   Final Result   1.  No evidence of pulmonary embolism.   2. Multifocal irregular predominantly peripheral areas of   consolidation, volume loss and scarring in the lower lobes   bilaterally as well as the lingula which could be infectious.                  MACRO:   None.        Signed by: Fabian Arboleda 4/7/2024 5:00 PM   Dictation workstation:   [id number]         Labs Reviewed   CBC WITH AUTO DIFFERENTIAL - Abnormal       Result Value    WBC 6.3      nRBC 0.0      RBC 3.20 (*)     Hemoglobin 10.2 (*)     Hematocrit 30.9 (*)     MCV 97      MCH 31.9      MCHC 33.0      RDW 15.9 (*)     Platelets 179      Neutrophils % 60.7      Immature Granulocytes %, Automated 0.3      Lymphocytes % 25.8      Monocytes % 10.5       Eosinophils % 2.2      Basophils % 0.5      Neutrophils Absolute 3.82      Immature Granulocytes Absolute, Automated 0.02      Lymphocytes Absolute 1.62      Monocytes Absolute 0.66      Eosinophils Absolute 0.14      Basophils Absolute 0.03     COMPREHENSIVE METABOLIC PANEL - Abnormal    Glucose 151 (*)     Sodium 138      Potassium 3.2 (*)     Chloride 95 (*)     Bicarbonate 33 (*)     Anion Gap 13      Urea Nitrogen 34 (*)     Creatinine 4.12 (*)     eGFR 15 (*)     Calcium 8.7      Albumin 4.0      Alkaline Phosphatase 62      Total Protein 7.0      AST 14      Bilirubin, Total 0.5      ALT 9 (*)    APTT - Abnormal    aPTT 39 (*)     Narrative:     The APTT is no longer used for monitoring Unfractionated Heparin Therapy. For monitoring Heparin Therapy, use the Heparin Assay.   B-TYPE NATRIURETIC PEPTIDE - Abnormal     (*)     Narrative:        <100 pg/mL - Heart failure unlikely  100-299 pg/mL - Intermediate probability of acute heart                  failure exacerbation. Correlate with clinical                  context and patient history.    >=300 pg/mL - Heart Failure likely. Correlate with clinical                  context and patient history.    BNP testing is performed using different testing methodology at [facility] than at other [facility]. Direct result comparisons should only be made within the same method.      D-DIMER, VTE EXCLUSION - Abnormal    D-Dimer, Quantitative VTE Exclusion 891 (*)     Narrative:     The VTE Exclusion D-Dimer assay is reported in ng/mL Fibrinogen Equivalent Units (FEU).    Per 's instructions for use, a value of less than 500 ng/mL (FEU) may help to exclude DVT or PE in outpatients when the assay is used with a clinical pretest probability assessment.(AEMR must utilize and document eCalc 'Wells Score Deep Vein Thrombosis Risk' for DVT exclusion only. Emergency Department should utilize  Guidelines for Emergency Department Use of the  VTE Exclusion D-Dimer and Clinical Pretest probability assessment model for DVT or PE exclusion.)   MAGNESIUM - Normal    Magnesium 1.86     PROTIME-INR - Normal    Protime 11.0      INR 1.0     SARS-COV-2 AND INFLUENZA A/B PCR - Normal    Flu A Result Not Detected      Flu B Result Not Detected      Coronavirus 2019, PCR Not Detected      Narrative:     This assay has received FDA Emergency Use Authorization (EUA) and  is only authorized for the duration of time that circumstances exist to justify the authorization of the emergency use of in vitro diagnostic tests for the detection of SARS-CoV-2 virus and/or diagnosis of COVID-19 infection under section 564(b)(1) of the Act, 21 U.S.C. 360bbb-3(b)(1). Testing for SARS-CoV-2 is only recommended for patients who meet current clinical and/or epidemiological criteria as defined by federal, state, or local public health directives. This assay is an in vitro diagnostic nucleic acid amplification test for the qualitative detection of SARS-CoV-2, Influenza A, and Influenza B from nasopharyngeal specimens and has been validated for use at [facility]. Negative results do not preclude COVID-19 infections or Influenza A/B infections, and should not be used as the sole basis for diagnosis, treatment, or other management decisions. If Influenza A/B and RSV PCR results are negative, testing for Parainfluenza virus, Adenovirus and Metapneumovirus is routinely performed for [facility] pediatric oncology and intensive care inpatients, and is available on other patients by placing an add-on request.    SERIAL TROPONIN-INITIAL - Normal    Troponin I, High Sensitivity 18      Narrative:     Less than 99th percentile of normal range cutoff-  Female and children under 18 years old <14 ng/L; Male <21 ng/L: Negative  Repeat testing should be performed if clinically indicated.     Female and children under 18 years old 14-50 ng/L; Male 21-50 ng/L:  Consistent with possible  cardiac damage and possible increased clinical   risk. Serial measurements may help to assess extent of myocardial damage.     >50 ng/L: Consistent with cardiac damage, increased clinical risk and  myocardial infarction. Serial measurements may help assess extent of   myocardial damage.      NOTE: Children less than 1 year old may have higher baseline troponin   levels and results should be interpreted in conjunction with the overall   clinical context.     NOTE: Troponin I testing is performed using a different   testing methodology at [facility] than at other   [facility]. Direct result comparisons should only   be made within the same method.   SERIAL TROPONIN, 1 HOUR - Normal    Troponin I, High Sensitivity 19      Narrative:     Less than 99th percentile of normal range cutoff-  Female and children under 18 years old <14 ng/L; Male <21 ng/L: Negative  Repeat testing should be performed if clinically indicated.     Female and children under 18 years old 14-50 ng/L; Male 21-50 ng/L:  Consistent with possible cardiac damage and possible increased clinical   risk. Serial measurements may help to assess extent of myocardial damage.     >50 ng/L: Consistent with cardiac damage, increased clinical risk and  myocardial infarction. Serial measurements may help assess extent of   myocardial damage.      NOTE: Children less than 1 year old may have higher baseline troponin   levels and results should be interpreted in conjunction with the overall   clinical context.     NOTE: Troponin I testing is performed using a different   testing methodology at [facility] than at other   [facility]. Direct result comparisons should only   be made within the same method.   TROPONIN SERIES- (INITIAL, 1 HR)    Narrative:     The following orders were created for panel order Troponin I Series, High Sensitivity (0, 1 HR).  Procedure                               Abnormality         Status                      ---------                               -----------         ------                     Troponin I, High Sensiti...[[id number]]  Normal              Final result               Troponin, High Sensitivi...[[id number]]  Normal              Final result                 Please view results for these tests on the individual orders.        Medical Decision Making    EKG interpreted by ED physician: Sinus rhythm with first-degree AV block and occasional PACs rate of 75.  DC is prolonged at 244 consistent with first-degree AV block.  QRS is within normal range.  QTc is prolonged at 515.  No significant ST elevations or depressions.  No significant Q waves.  Good R wave progression.  Left axis deviation.  No previous EKG for comparison.    Repeat EKG interpreted by ED physician: Sinus rhythm with rate of 68.  Occasional PVC.  DC, QRS within normal range.  QTc is prolonged at 521.  No significant ST elevations or depressions.  No significant Q waves.  Good R wave progression.  Left axis deviation.    68-year-old presents emergency department via EMS with report of tachyarrhythmia and altered level of consciousness with hypotension.  EMS report they found patient to be in what appeared to be SVT, but was unstable.  Patient does have history of atrial fibrillation and it is possible this was atrial flutter.  Patient was synchronized cardioverted initially with 100 J and then 200 J in the field after receiving Versed.  Upon arrival to the emergency department he is alert and oriented x 4 without focal neurologic deficit.  Initial blood pressure was borderline low, but this improved with fluid bolus that had been started by EMS.  Patient got a total of 750 cc of normal saline and blood pressure improved and was stable here in the ED.  EKG obtained on patient's arrival showed this rhythm.  Patient's EKGs were significant for prolonged QT I do not have previous EKGs for comparison.  COVID and flu testing negative.  BNP mildly  elevated though nonspecific given the patient's kidney function.  CMP significant for hypokalemia and consistent with his end-stage renal disease.  Patient given oral potassium.  Cardiac enzymes x 2 and EKG did not show acute ACS.  Magnesium was within normal range.  CBC shows anemia at baseline no significant leukocytosis.  Patient does not have findings of sepsis.  D-dimers are elevated and subsequent CT PE study did not show pulmonary embolus, but did show multifocal irregularity which may represent infection.  Chest x-ray also showed findings of basilar opacities that could be infectious or atelectasis.  Patient denies pneumonia symptoms such as cough and he does not have findings of sepsis though given this abnormality he is given doxycycline here and for home.  I recommended the patient be admitted due to his unstable tachyarrhythmia.  I discussed with him though his EKG is normal now he is at high risk to go back into an unstable arrhythmia.  Patient tells me that he does not want to stay in the hospital.  He states that he will call for help if needed.  I did discuss with him that he may need a pacer and if he is unstable with his tachyarrhythmia he may be unable to call for help if something happens.  Patient did voiced understanding of this.  He states he still does not wish to stay in the hospital and would prefer to follow-up outpatient.  He is advised to follow-up with his primary care doctor as well as cardiology.  Patient wishes to leave A.  He/she appeared rational, alert, appropriate, and exhibited no signs/symptoms of psychosis or suicidal ideation.  Patient has the capacity to make this medical decision.  Patient was aware of his/her suspected diagnosis as suggested by the initial screening exam and acknowledged their understanding for my recommendations (hospitalization, transfer, further testing, medical treatment).  Risks of refusal of recommended care were disclosed to the patient including,  but not limited to death, permanent mental or physical impairment, loss of current lifestyle or paralysis.  Welcomed to return to the ED at any time regardless of their ability to pay and was provided follow up instructions.  The patient will leave AMA at this time.          Diagnoses as of 04/07/24 2039   Tachyarrhythmia   Pneumonia of both lungs due to infectious organism, unspecified part of lung   QT prolongation   Hypokalemia      1. Tachyarrhythmia        2. Pneumonia of both lungs due to infectious organism, unspecified part of lung  doxycycline (Vibramycin) 100 mg capsule      3. QT prolongation        4. Hypokalemia           Critical Care    Performed by: Tommie Stevenson DO  Authorized by: Tommie Stevenson DO    Critical care provider statement:     Critical care time (minutes):  35    Critical care time was exclusive of:  Separately billable procedures and treating other patients    Critical care was necessary to treat or prevent imminent or life-threatening deterioration of the following conditions: Cardiac dysrhythmia.    Critical care was time spent personally by me on the following activities:  Evaluation of patient's response to treatment, examination of patient, pulse oximetry, ordering and review of radiographic studies, ordering and review of laboratory studies and re-evaluation of patient's condition       This note was dictated using dragon software and may contain errors related to dictation interpretation errors.      Tommie Stevenson DO  04/07/24 2038       Tommie Stevenson DO  04/07/24 2039

## 2024-04-07 NOTE — DISCHARGE INSTRUCTIONS
Follow up with your primary care doctor and cardiology. Return to the ER if symptoms worsen or change. We recommended you be admitted for further evaluation and treatment of your symptoms.  We are concerned for serious cardiac arrhythmia that causes you to be hemodynamically unstable.  You have chosen to leave AGAINST MEDICAL ADVICE.  Risk of leaving AGAINST MEDICAL ADVICE include death, disability, worsening symptoms, and delay diagnosis.  If you change her mind anytime you can return to the emergency department for further evaluation and treatment.

## 2024-04-08 LAB
ATRIAL RATE: 68 BPM
ATRIAL RATE: 75 BPM
P AXIS: 12 DEGREES
P AXIS: 14 DEGREES
P OFFSET: 171 MS
P OFFSET: 177 MS
P ONSET: 138 MS
P ONSET: 94 MS
PR INTERVAL: 150 MS
PR INTERVAL: 244 MS
Q ONSET: 213 MS
Q ONSET: 216 MS
QRS COUNT: 11 BEATS
QRS COUNT: 13 BEATS
QRS DURATION: 112 MS
QRS DURATION: 114 MS
QT INTERVAL: 462 MS
QT INTERVAL: 490 MS
QTC CALCULATION(BAZETT): 515 MS
QTC CALCULATION(BAZETT): 521 MS
QTC FREDERICIA: 497 MS
QTC FREDERICIA: 511 MS
R AXIS: -10 DEGREES
R AXIS: -10 DEGREES
T AXIS: 41 DEGREES
T AXIS: 42 DEGREES
T OFFSET: 447 MS
T OFFSET: 458 MS
VENTRICULAR RATE: 68 BPM
VENTRICULAR RATE: 75 BPM

## 2024-05-07 ENCOUNTER — DOCUMENTATION (OUTPATIENT)
Dept: TRANSPLANT | Facility: HOSPITAL | Age: 69
End: 2024-05-07
Payer: MEDICARE

## 2024-05-07 NOTE — PROGRESS NOTES
Per EV Medicare A,B,D & MMO Medicare Supp active.  Listing approval is not needed.     [FreeTextEntry1] : review of information in the E M H R\par  rate 92 normal axis NSR ; possilbe anterior infarction

## 2024-05-14 ENCOUNTER — SOCIAL WORK (OUTPATIENT)
Dept: TRANSPLANT | Facility: HOSPITAL | Age: 69
End: 2024-05-14
Payer: MEDICARE

## 2024-05-14 ENCOUNTER — OFFICE VISIT (OUTPATIENT)
Dept: TRANSPLANT | Facility: HOSPITAL | Age: 69
End: 2024-05-14
Payer: MEDICARE

## 2024-05-14 ENCOUNTER — EDUCATION (OUTPATIENT)
Dept: TRANSPLANT | Facility: HOSPITAL | Age: 69
End: 2024-05-14
Payer: MEDICARE

## 2024-05-14 ENCOUNTER — DOCUMENTATION (OUTPATIENT)
Dept: TRANSPLANT | Facility: HOSPITAL | Age: 69
End: 2024-05-14

## 2024-05-14 ENCOUNTER — LAB (OUTPATIENT)
Dept: LAB | Facility: LAB | Age: 69
End: 2024-05-14
Payer: MEDICARE

## 2024-05-14 VITALS
HEART RATE: 80 BPM | WEIGHT: 243.8 LBS | BODY MASS INDEX: 34.9 KG/M2 | DIASTOLIC BLOOD PRESSURE: 70 MMHG | TEMPERATURE: 97.5 F | TEMPERATURE: 97.5 F | BODY MASS INDEX: 34.9 KG/M2 | HEIGHT: 70 IN | SYSTOLIC BLOOD PRESSURE: 114 MMHG | WEIGHT: 243.8 LBS | OXYGEN SATURATION: 95 % | DIASTOLIC BLOOD PRESSURE: 70 MMHG | OXYGEN SATURATION: 95 % | HEART RATE: 80 BPM | HEIGHT: 70 IN | SYSTOLIC BLOOD PRESSURE: 114 MMHG

## 2024-05-14 DIAGNOSIS — Z79.4 TYPE 2 DIABETES MELLITUS WITH CHRONIC KIDNEY DISEASE ON CHRONIC DIALYSIS, WITH LONG-TERM CURRENT USE OF INSULIN (MULTI): Primary | ICD-10-CM

## 2024-05-14 DIAGNOSIS — Z01.818 PRE-TRANSPLANT EVALUATION FOR KIDNEY TRANSPLANT: ICD-10-CM

## 2024-05-14 DIAGNOSIS — Z12.5 ENCOUNTER FOR SCREENING FOR MALIGNANT NEOPLASM OF PROSTATE: ICD-10-CM

## 2024-05-14 DIAGNOSIS — Z99.2 TYPE 2 DIABETES MELLITUS WITH CHRONIC KIDNEY DISEASE ON CHRONIC DIALYSIS, WITH LONG-TERM CURRENT USE OF INSULIN (MULTI): Primary | ICD-10-CM

## 2024-05-14 DIAGNOSIS — Z13.6 ENCOUNTER FOR SCREENING FOR CARDIOVASCULAR DISORDERS: ICD-10-CM

## 2024-05-14 DIAGNOSIS — N18.6 ESRD (END STAGE RENAL DISEASE) (MULTI): Primary | ICD-10-CM

## 2024-05-14 DIAGNOSIS — N18.6 ESRD (END STAGE RENAL DISEASE) (MULTI): ICD-10-CM

## 2024-05-14 DIAGNOSIS — Z79.899 OTHER LONG TERM (CURRENT) DRUG THERAPY: ICD-10-CM

## 2024-05-14 DIAGNOSIS — N18.9 CHRONIC KIDNEY DISEASE, UNSPECIFIED CKD STAGE: ICD-10-CM

## 2024-05-14 DIAGNOSIS — N18.6 TYPE 2 DIABETES MELLITUS WITH CHRONIC KIDNEY DISEASE ON CHRONIC DIALYSIS, WITH LONG-TERM CURRENT USE OF INSULIN (MULTI): Primary | ICD-10-CM

## 2024-05-14 DIAGNOSIS — E11.22 TYPE 2 DIABETES MELLITUS WITH CHRONIC KIDNEY DISEASE ON CHRONIC DIALYSIS, WITH LONG-TERM CURRENT USE OF INSULIN (MULTI): Primary | ICD-10-CM

## 2024-05-14 LAB
ABO GROUP (TYPE) IN BLOOD: NORMAL
ALBUMIN SERPL BCP-MCNC: 4 G/DL (ref 3.4–5)
ALP SERPL-CCNC: 79 U/L (ref 33–136)
ALT SERPL W P-5'-P-CCNC: 9 U/L (ref 10–52)
AMYLASE SERPL-CCNC: 50 U/L (ref 29–103)
AST SERPL W P-5'-P-CCNC: 13 U/L (ref 9–39)
BILIRUB DIRECT SERPL-MCNC: 0.1 MG/DL (ref 0–0.3)
BILIRUB SERPL-MCNC: 0.4 MG/DL (ref 0–1.2)
BUN SERPL-MCNC: 54 MG/DL (ref 6–23)
C PEPTIDE SERPL-MCNC: 10.8 NG/ML (ref 0.7–3.9)
CHOLEST SERPL-MCNC: 208 MG/DL (ref 0–199)
CHOLESTEROL/HDL RATIO: 6
CMV IGG AVIDITY SERPL IA-RTO: NONREACTIVE %
CREAT SERPL-MCNC: 6.6 MG/DL (ref 0.5–1.3)
EBV EA IGG SER QL: NEGATIVE
EBV NA AB SER QL: POSITIVE
EBV VCA IGG SER IA-ACNC: POSITIVE
EBV VCA IGM SER IA-ACNC: NEGATIVE
EGFRCR SERPLBLD CKD-EPI 2021: 9 ML/MIN/1.73M*2
ERYTHROCYTE [DISTWIDTH] IN BLOOD BY AUTOMATED COUNT: 14.7 % (ref 11.5–14.5)
EST. AVERAGE GLUCOSE BLD GHB EST-MCNC: 143 MG/DL
HBA1C MFR BLD: 6.6 %
HBV CORE AB SER QL: NONREACTIVE
HBV SURFACE AB SER-ACNC: 33.1 MIU/ML
HBV SURFACE AG SERPL QL IA: NONREACTIVE
HCT VFR BLD AUTO: 32.8 % (ref 41–52)
HCV AB SER QL: NONREACTIVE
HDLC SERPL-MCNC: 34.8 MG/DL
HGB BLD-MCNC: 10.5 G/DL (ref 13.5–17.5)
HIV 1+2 AB+HIV1 P24 AG SERPL QL IA: NONREACTIVE
INR PPP: 0.9 (ref 0.9–1.1)
MCH RBC QN AUTO: 31.7 PG (ref 26–34)
MCHC RBC AUTO-ENTMCNC: 32 G/DL (ref 32–36)
MCV RBC AUTO: 99 FL (ref 80–100)
NON-HDL CHOLESTEROL: 173 MG/DL (ref 0–149)
NRBC BLD-RTO: 0 /100 WBCS (ref 0–0)
PHOSPHATE SERPL-MCNC: 3.9 MG/DL (ref 2.5–4.9)
PLATELET # BLD AUTO: 240 X10*3/UL (ref 150–450)
PROT SERPL-MCNC: 6.8 G/DL (ref 6.4–8.2)
PROTHROMBIN TIME: 10.3 SECONDS (ref 9.8–12.8)
RBC # BLD AUTO: 3.31 X10*6/UL (ref 4.5–5.9)
RH FACTOR (ANTIGEN D): NORMAL
TREPONEMA PALLIDUM IGG+IGM AB [PRESENCE] IN SERUM OR PLASMA BY IMMUNOASSAY: NONREACTIVE
VARICELLA ZOSTER IGG INDEX: 2.3 IA
VZV IGG SER QL IA: POSITIVE
WBC # BLD AUTO: 7.9 X10*3/UL (ref 4.4–11.3)

## 2024-05-14 PROCEDURE — 86663 EPSTEIN-BARR ANTIBODY: CPT

## 2024-05-14 PROCEDURE — 86481 TB AG RESPONSE T-CELL SUSP: CPT

## 2024-05-14 PROCEDURE — 86825 HLA X-MATH NON-CYTOTOXIC: CPT | Mod: OUT | Performed by: SURGERY

## 2024-05-14 PROCEDURE — 36415 COLL VENOUS BLD VENIPUNCTURE: CPT

## 2024-05-14 PROCEDURE — 86706 HEP B SURFACE ANTIBODY: CPT

## 2024-05-14 PROCEDURE — 85610 PROTHROMBIN TIME: CPT

## 2024-05-14 PROCEDURE — 83718 ASSAY OF LIPOPROTEIN: CPT

## 2024-05-14 PROCEDURE — 86665 EPSTEIN-BARR CAPSID VCA: CPT

## 2024-05-14 PROCEDURE — 84154 ASSAY OF PSA FREE: CPT

## 2024-05-14 PROCEDURE — 86664 EPSTEIN-BARR NUCLEAR ANTIGEN: CPT

## 2024-05-14 PROCEDURE — 80323 ALKALOIDS NOS: CPT

## 2024-05-14 PROCEDURE — 86803 HEPATITIS C AB TEST: CPT

## 2024-05-14 PROCEDURE — 83036 HEMOGLOBIN GLYCOSYLATED A1C: CPT

## 2024-05-14 PROCEDURE — 86644 CMV ANTIBODY: CPT

## 2024-05-14 PROCEDURE — 84681 ASSAY OF C-PEPTIDE: CPT

## 2024-05-14 PROCEDURE — G0103 PSA SCREENING: HCPCS

## 2024-05-14 PROCEDURE — 84100 ASSAY OF PHOSPHORUS: CPT

## 2024-05-14 PROCEDURE — 86787 VARICELLA-ZOSTER ANTIBODY: CPT

## 2024-05-14 PROCEDURE — 86901 BLOOD TYPING SEROLOGIC RH(D): CPT

## 2024-05-14 PROCEDURE — 83090 ASSAY OF HOMOCYSTEINE: CPT

## 2024-05-14 PROCEDURE — 80307 DRUG TEST PRSMV CHEM ANLYZR: CPT

## 2024-05-14 PROCEDURE — 82465 ASSAY BLD/SERUM CHOLESTEROL: CPT

## 2024-05-14 PROCEDURE — 99215 OFFICE O/P EST HI 40 MIN: CPT | Performed by: INTERNAL MEDICINE

## 2024-05-14 PROCEDURE — 82150 ASSAY OF AMYLASE: CPT

## 2024-05-14 PROCEDURE — 86900 BLOOD TYPING SEROLOGIC ABO: CPT

## 2024-05-14 PROCEDURE — 81382 HLA II TYPING 1 LOC HR: CPT | Mod: 91,OUT | Performed by: SURGERY

## 2024-05-14 PROCEDURE — 86704 HEP B CORE ANTIBODY TOTAL: CPT

## 2024-05-14 PROCEDURE — 82565 ASSAY OF CREATININE: CPT

## 2024-05-14 PROCEDURE — 85027 COMPLETE CBC AUTOMATED: CPT

## 2024-05-14 PROCEDURE — 87389 HIV-1 AG W/HIV-1&-2 AB AG IA: CPT

## 2024-05-14 PROCEDURE — 80076 HEPATIC FUNCTION PANEL: CPT

## 2024-05-14 PROCEDURE — 84520 ASSAY OF UREA NITROGEN: CPT

## 2024-05-14 PROCEDURE — 99215 OFFICE O/P EST HI 40 MIN: CPT | Performed by: TRANSPLANT SURGERY

## 2024-05-14 PROCEDURE — 87340 HEPATITIS B SURFACE AG IA: CPT

## 2024-05-14 PROCEDURE — 86780 TREPONEMA PALLIDUM: CPT

## 2024-05-14 SDOH — ECONOMIC STABILITY: FOOD INSECURITY: WITHIN THE PAST 12 MONTHS, YOU WORRIED THAT YOUR FOOD WOULD RUN OUT BEFORE YOU GOT MONEY TO BUY MORE.: NEVER TRUE

## 2024-05-14 SDOH — ECONOMIC STABILITY: FOOD INSECURITY: WITHIN THE PAST 12 MONTHS, THE FOOD YOU BOUGHT JUST DIDN'T LAST AND YOU DIDN'T HAVE MONEY TO GET MORE.: NEVER TRUE

## 2024-05-14 ASSESSMENT — ENCOUNTER SYMPTOMS
COLOR CHANGE: 0
CHILLS: 0
CONFUSION: 0
FEVER: 0
DIZZINESS: 0
SHORTNESS OF BREATH: 0
ADENOPATHY: 0
ABDOMINAL DISTENTION: 0
FREQUENCY: 0
DYSURIA: 0
COUGH: 0
HEMATURIA: 0
ARTHRALGIAS: 0
CONSTIPATION: 0
EYES NEGATIVE: 1
AGITATION: 0
HALLUCINATIONS: 0
DIARRHEA: 0
LIGHT-HEADEDNESS: 0
ABDOMINAL PAIN: 0
WEAKNESS: 0

## 2024-05-14 ASSESSMENT — PAIN SCALES - GENERAL
PAINLEVEL: 0-NO PAIN
PAINLEVEL: 0-NO PAIN

## 2024-05-14 ASSESSMENT — PATIENT HEALTH QUESTIONNAIRE - PHQ9
2. FEELING DOWN, DEPRESSED OR HOPELESS: NOT AT ALL
SUM OF ALL RESPONSES TO PHQ9 QUESTIONS 1 & 2: 0
1. LITTLE INTEREST OR PLEASURE IN DOING THINGS: NOT AT ALL

## 2024-05-14 NOTE — PROGRESS NOTES
TRANSPLANT NEPHROLOGY CONSULT :   KIDNEY TRANSPLANT RECIPIENT EVALUATION        SERVICE DATE: 05/14/2024     REASON FOR CONSULT/CHIEF COMPLAINT:    FOR KIDNEY TRANSPLANT RECIPIENT EVALUATION.    HPI:    Mr. Craig is a 68 y.o. male with h/o ESRD sec to longstanding DM2, on HD x2.5y (via LUE AVG, [facility], Dr. Jm Nice, [facility]), HTN, chronic diastolic HF, OPAL not on CPAP, T2DM, h/o paroxysmal SVT, HTN, HLD who is here for pre-txp eval.     Pt accompanied by his 79y/o friend. 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     Tolerating HD well via LUE AVG. Last admission 4/2024 for hypervolemia.   Last A1C 6.8% 2/2024.    H/o paroxysmal SVT for which pt was referred to EP, underwent an EPS on 11/29/2023 by Dr. Lemos however no SVT was able to be induced and therefore no ablation was done. He has been lost to follow up with EP after that. Currently on betablocker. Unclear if he has ever had a LHC done.     No adherent to CPAP. No recent updated sleep study.     No h/o malignancy, CAD/MI, CVA/TIA.     Lives alone. Has friends close by and his family (in [place]) can act as support system.     Family history notable for DM2 in multiple members. No h/o kidney disease in the family.    No potential donors at this time.     Fully functional. Able to perform all ADLs and IADLs independently.     Does report intermittent intra-dialytic hypotension with symptoms that are short lived.     The patient is here for kidney transplant recipient evaluation. Mr. Craig has had multiple complications from end stage severe renal disease including anemia, secondary hyperparathyroidism, and osteodystrophy. The patient is here today for an evaluation for kidney transplantation to improve quality of life and decrease the risk of cardiovascular disease, coronary artery disease and stroke.     Denied chest pain, shortness of breath, palpitation, dyspnea on exertion, dysuria, fever, nausea, vomiting, diarrhea and flu-liked  symptoms. No swelling of the extremities.     ROS:  Review of  14 systems was performed system by system. See HPI. Otherwise, the symptoms were negative.    PAST MEDICAL HISTORY:  No past medical history on file.     PAST SURGICAL HISTORY:  Past Surgical History:   Procedure Laterality Date    OTHER SURGICAL HISTORY  11/21/2019    Toe amputation        SOCIAL HISTORY:  Social History     Socioeconomic History    Marital status: Single     Spouse name: Not on file    Number of children: Not on file    Years of education: Not on file    Highest education level: Not on file   Occupational History    Not on file   Tobacco Use    Smoking status: Not on file    Smokeless tobacco: Not on file   Substance and Sexual Activity    Alcohol use: Not on file    Drug use: Not on file    Sexual activity: Not on file   Other Topics Concern    Not on file   Social History Narrative    Not on file     Social Determinants of Health     Financial Resource Strain: Low Risk  (11/30/2020)    Received from [facility]    Overall Financial Resource Strain (CARDIA)     Difficulty of Paying Living Expenses: Not hard at all   Food Insecurity: No Food Insecurity (4/13/2024)    Received from [facility]    Hunger Vital Sign     Worried About Running Out of Food in the Last Year: Never true     Ran Out of Food in the Last Year: Never true   Transportation Needs: No Transportation Needs (4/13/2024)    Received from [facility]    PRAPARE - Transportation     Lack of Transportation (Medical): No     Lack of Transportation (Non-Medical): No   Physical Activity: Not on file   Stress: Not on file   Social Connections: Not on file   Intimate Partner Violence: Not on file   Housing Stability: Not on file       FAMILY HISTORY:  No family history on file.    MEDICATION LIST:  No current outpatient medications    ALLERGY  No Known Allergies    PHYSICAL EXAM:    Visit Vitals  /70   Pulse 80   Temp 36.4 °C (97.5 °F) (Temporal)   Ht 1.765  "m (5' 9.5\")   Wt 111 kg (243 lb 12.8 oz)   SpO2 95%   BMI 35.49 kg/m²   BSA 2.33 m²        General Appearance - NAD, Good speech, oriented and alert  HEENT - Supple. Not pale. No jaundice. No cervical lymphadenopathy. Pharynx and tonsils are not injected.  CVS - RRR. Normal S1/S2. No murmur, click , rub or gallop  Lungs- clear to auscultation bilaterally  Abdomen - soft , not tender, no guarding, no rigidity. No hepatosplenomegaly. Normal bowel sounds. No masses and ascites.   Musculoskeletal /Extremities - no edema. Full ROM. No joint tenderness.   Neuro/Psych - appropriate mood and affect. Motor power V/V all extremities. CN I -XII were grossly intact.  Skin - No visible rash  Dialysis access : RYAN AVG is clean, dry and intact. No signs of infection.      LABS:    Lab Results   Component Value Date    WBC 6.3 04/07/2024    HGB 10.2 (L) 04/07/2024    HCT 30.9 (L) 04/07/2024     04/07/2024    CHOL 102 11/18/2019    TRIG 60 11/18/2019    HDL 31.3 (A) 11/18/2019    ALT 9 (L) 04/07/2024    AST 14 04/07/2024     04/07/2024    K 3.2 (L) 04/07/2024    CL 95 (L) 04/07/2024    CREATININE 4.12 (H) 04/07/2024    BUN 34 (H) 04/07/2024    CO2 33 (H) 04/07/2024    PSA 4.6 (H) 12/17/2019    INR 1.0 04/07/2024    HGBA1C 7.6 (H) 05/18/2023     EPS: 11/29/23  1. Clinical arrhythmia procedure outcome: not targeted   2. Sinus node function was normal.   3. AV abraham testing revealed normal function.     Echo: 2021  - The left ventricle is mildly dilated. There is severe concentric left   ventricular hypertrophy. Left ventricular systolic function is normal. EF = 57 ±   5% (2D biplane) Definity contrast used for endocardial border detection. Grade II   left ventricular diastolic dysfunction.   - The right ventricle is mildly dilated. Right ventricular systolic function is   normal.   - The left atrial cavity is moderately dilated.   - The visualized aorta is dilated with a maximal dimension of 4.1 cm.   - Estimated right " ventricular systolic pressure is 60 mmHg consistent with   moderate pulmonary hypertension. Estimated right atrial pressure is 3 mmHg based   on IVC assessment.   - The patient has not had a prior CC echocardiographic exam for comparison.   Electronically signed by Marco A Vanegas DO on 12/15/2021 at 2:04:50 PM     ASSESSMENT AND PLAN:    Pt may prove to be a reasonable candidate to proceed with further eval for kidney transplant.     Needs cardiac eval given h/o HFpEF, SVT. Will refer to  cardiology for further eval. Initial cardiac testing per protocol. Will likely need left heart cath.    Needs updated sleep study and demonstrate adherence to CPAP.     No potential donors. Reports adequate social support.   Needs updated cancer screening per age/sex  Complete rest of the work up per protocol    The case will be presented at the selection committee at the [facility].  The final decision from the committee will be sent out to notify the patient/primary care physician/ nephrologist. The above recommendations were discussed with the patient at length.     In addition, the following were also discussed:    - Risks and benefits of transplantation, both short-term and long-term    - Risk of primary graft non-function, DGF, SGF, rejection, primary disease recurrence, return to dialysis    - Risks of immunosuppression including infections, CA, CV risk    - Need for compliance with medications and medical care in general    The patient expressed understanding of the above and wishes to proceed.  I answered all of his questions. I urged the patient to look for living donors.    - I have spent over 60 minutes with the patient, reviewing medical record, lab result , CXR result and other specialty's notes. More than 50% of the time was spent in counseling, explaining about the transplantation and answering the questions. I also reviewed the medical record, blood test  results, imaging and previous studies which were obtained from the nephrologists.     Capo Horta  Transplant Nephrology

## 2024-05-14 NOTE — PROGRESS NOTES
Patient attended class on 05/14/24.  Accompanied to class with male support.  Oral and written education was provided.  Patient remained attentive throughout the review session, asking appropriate questions.  Evaluation consents were signed.    PRE-TRANSPLANT EDUCATION  Patient received education regarding the following topics as part of their pre-transplant evaluation:  The evaluation process, including:  Transplant team members and roles    Required consultations and testing  Selection criteria and suitability for transplant  Listing process and receiving an organ offer  Psychosocial and financial considerations for a successful transplant  Patient responsibilities, including the necessity of adhering to a strict medical regimen  An overview of the surgical procedure   Potential medical, surgical, and psychosocial risks to transplantation, including:  Wound infection  Pneumonia  Blood clot formation  Organ rejection, failure, and possibility of re-transplantation  Lifetime immunosuppression therapy and associated risks  Arrhythmias and cardiovascular collapse  Multi-organ system failure  Death  Depression  Post-Traumatic Stress Disorder  Generalized anxiety, issues of dependence, and feelings of guilt  Available alternatives to transplantation  Donor risk factors that could affect the success of the transplant and the health of the patient, including:  Donor age  Donor medical and social history  Condition of the organ  Risk of melissa cancer, HIV, Hepatitis B, Hepatitis C, or malaria if the infection is not detectable at the time of donation  Patient?s right to withdraw consent for transplantation at any time during the process  Transplants not performed in a Medicare-approved transplant center could affect the patient?s ability to have immunosuppression medication paid for under Medicare part B.   Multiple listing options.    Patient was given the opportunity to have questions answered. Patient was provided  a copy of the informed consent for transplant evaluation.    Signed evaluation informed consent received? YES

## 2024-05-14 NOTE — PROGRESS NOTES
"ENCOUNTER    Visit Type Initial Visit  Location: [place] [address]    Barriers to Communication / Understanding:   [] Language [] Vision [] Hearing [] Other      []  Present     Accompanied By: Friend, Edgardo    Organ For Transplant:  Kidney    Ethnicity:  White    ADLs Fully Independent      Instrumental ADLs Fully Independent      Level of Activity Sedentary      DME: Denied     Knowledge of Health Good    Why Do You Have End Stage Organ Disease   Pt reported the cause of his kidney diease is diabetes.     Knowledge of Transplant / VAD:  Yes Patient Is Able To Make An Informed Decision    Patient Understands the Risks of Transplant / VAD  Yes Rejection  Yes Infection Yes Complications  Yes Death    Patient Understands Recovery and Follow-Up from Transplant / VAD  Yes Length Of State Yes Appointments  Yes Labs  Yes Rehabilitation    Patient Has Identified Goals of Transplant / VAD Yes  Pt reported a goal of transplant is \"better quality of life, not going to dialysis, and no diet restrictions.\" Pt shared \"I'm not sure if I even want this done.\"     Any Potential Donors?     Overall Compliance  Good     Pt reported he takes 4 medications daily.    Compliance With Medications Good    Managed By Patient   Bottles    Understanding Of Medication  Good  Compliance With Appointments Good    How Does Patient Handle Health Problems      Organ  Kidney    IOP:    Fluid Restriction:   Yes [] Compliant   \"Couple\" liters a day - Pt reported he goes over often on weekends.     Medical And Clinic Appointment Compliant     Dialysis:  [x] What Dialysis Center   [facility]  [x] Began   2 years ago      [] In Center [] Home Hemo [] Peritoneal       Attendance:  Treatment Attendance Good Treatment Time M, W, F - Runs for 4 hrs    [] Shortened Treatments [] Rescheduled Treatments [] Missed Treatments       Fluids:     Does Patient Still Urinate  [] Fluid Restriction [] IDWG [] EDW  Achieved Dry Weight        Diet:   " Patient is compliant with renal restrictions     Patient is compliant with low sodium diet      Labs:    [] Patient Reported [] Collateral       []  Potassium [] Albumin [] Phosphorus      # of Binders:  [x] # of Binders per meal   4 [x] Meals per day   2      [x]  # of Binders per Snack   1 [x] Snacks per day   2-3 [] Phosphorus     Pancreas:  [] Checks blood sugar      times/day [] A1c   Hypoglycemic Episodes  Unawareness  Outside Interventions    Liver:  Is Lactulose prescribed  Dose:   Timesper day:  Is patient compliant       SOCIAL HISTORY  No       Education:  education: Other Doctorate in Dentistry    Literate Yes   Computer literate Yes  Internet access No       Sources of Income: SSI ($2,600 monthly), 401k (take out when needed)  Patient's Current Employment    [] Full-Time [] Part-Time [] Unemployed [x] Retired     []  None [] Not working by choice [] Not working disabled     [] Short Term Leave   [] Other   Employment History     Will patient have paid status from employment during recovery     Spouse / SO Current Employment     Will spouse / SO have paid status from employment during recovery     Other Sources of Income Total Household Income $31,000 yearly      Does patient have financial concerns   No     Is patient able to meet current monthly expenses   Yes    Resources:    Patient was provided information on transplant fundraising       Insurance:  Primary Insurance Medicare Part A & B    Secondary Insurance Medical [place] Supplement    Prescription Coverage Copay cost per month $150    Medicare coverage due to     Medicare Part A  Effective date    Medicare Part B  Effective date    Medicare Part C  Deductable  Out of Pocket Max    Medicare Part D  Extra Help?    Medicaid  Waiver  Redetermination Date    HMO       FAMILY SYSTEM    Single Yes    How long   Describe Relationship    How long   Describe Relationship    When     When  In a Relationship   How  Long  Describe Relationship    Spouse / SO Name   Age   Health   Other Caregiver Responsibilities  Spouse / Significant others reaction to donation    Children:  No # Biological   # Adopted   No # Step Children        Child #1 Name  Age   Health    Lives   How Much Contact     Child #2 Name  Age   Health    Lives   How Much Contact     Child #3 Name Age  Health    Lives   How Much Contact     Parents:  Raised By Both Biological Parents    Did the patient have contact with the other parent     Mother  Yes Age 70s  Cause of Death Dementia   Father  Yes Age 49  Cause of Death Work related accident, head cut off    Living Parent #1  Living Parent #2    Additional Information    Siblings:  [x] # Biological (1 brother, 1 sister) [] # Half Siblings [] # Step Siblings     Sibling #1 Luis, lives in [place], daily contact  Sibling #2 Kathy, lives in [place], rare contact    Support & Recovery Plan:  Yes Adequate    Primary Support:  Name Edgardo Phone [phone]  Age 84  Relationship to Patient Friend  If employed, can they take time off work Retired   If so, is it paid time off    If not, will this impact your finances    Did they attend education classes Yes   Do they have other caregiver responsibilities (child or eldercare) No   Do they have their own conditions which may prevent them from providing care for you No   Pt's friend walks with a cane.   (Medical, psychological, physical limitations)    Are they available on short notice Yes   Are they reliable Yes   Are they responsible Yes   Are they able to understand and process new information Yes   Do they have reliable transportation or will you allow them to use your vehicle Yes   Are they currently involved in your care Yes   Comments    Secondary Support:  Name Luis Phone [phone] Age 67  Relationship to Patient Brother  If employed, can they take time off work Retired   If so, is it paid time off    If not, will this impact your  "finances    Did they attend education classes No   Do they have other caregiver responsibilities (child or eldercare) No   Do they have their own conditions which may prevent them from providing care for you No  (Medical, psychological, physical limitations)    Are they available on short notice Yes   Are they reliable Yes   Are they responsible Yes   Are they able to understand and process new information Yes   Do they have reliable transportation or will you allow them to use your vehicle Yes   Are they currently involved in your care Yes   Comments  Pt's brother lives in [place].     Alternate Support:  Name Janny Phone [phone] Age 67  Relationship to Patient Friend  If employed, can they take time off work Yes   If so, is it paid time off    If not, will this impact your finances    Did they attend education classes No   Do they have other caregiver responsibilities (child or eldercare) No   Do they have their own conditions which may prevent them from providing care for you No   Pt's friend walks with a cane.   (Medical, psychological, physical limitations)    Are they available on short notice Yes   Are they reliable Yes   Are they responsible Yes   Are they able to understand and process new information Yes   Do they have reliable transportation or will you allow them to use your vehicle Yes   Are they currently involved in your care Yes   Comments    Alternate Support    Housing:  Yes Adequate Owns home  Type of Home Condo  Distance to [facility] 1 hour  Pets 0  Does Patient Feel Safe in Home Yes    Transportation:  Yes Adequate  # Licensed Drivers in the Home 1  Does Patient Drive Yes If not, why  # Reliable Vehicles 1  Does Patient use Public Transportation No  Does Patient use Medical Transportation No  Comments      MENTAL HEALTH    Cognition:  No impairment observed / reported    The patient reports their mood as \"fine.\"    Reported Mental Health Diagnosis   Denied - \"Edginess\"  Family History of " Mental Health Concerns   Denied  What are patient psychosocial stressors   Pt denied any current stressors.     Current Medications:  No  Mental Health Meds  Denied  Rx'd by   Sleep Meds  Denied  Rx'd by   Pain Meds  Denied  Rx'd by     OTC Meds   Vitamins  Past Medications   Denied    Counseling Never  Pt declined  resources at this time.     Has patient ever been hospitalized for mental health reasons No   Was the hospitalization voluntary  Duration   Where    When  Describe situation    Discharge Plan for Follow Up  Was Discharge Plan Completed   Referral to Transplant Psych   No  Mental Health Follow Up Required      Suicide Assessment:  History of Suicide Ideation No  [] Timeframe [] Frequency   Frequency   Plan Created  Intent to Follow Through  Outcome      History of Suicide Attempt No     History of Suicidal Ideation in the past 3 months No   Intensity   Duration     Description of Plan      Plans thought of  Intent to Follow Through  Highest Level of Intent to Follow Through    Current Plan for Safety    Plan for Follow-Up    Patient's Reported Trauma History   Pt reported a history of trauma.     What are patient's coping behaviors   Pt reported going to the casino and listening to sporting events as coping behaviors.     Spiritism / Spirituality   Denied    Attitude toward interviewer Cooperative and Appropriate    Eye Contact Patient maintained good eye contact throughout appointment    Appearance The patient was neatly groomed, appropriately dressed and adequately nourished    Affect Appropriate    Thought Process Appropriate    Substance Use /Abuse History:    Current Tobacco User No  Patient uses   Tobacco Frequency   For How Long  Is Patient Required to Quit     Former Tobacco User No  Describe past tobacco use and date quit    Current Alcohol User Yes  Type of Alcohol Used  Varies  Amount 1-2 Frequency Every couple months  Pattern of Alcohol Use  Pt reported he will have 1-2 drinks in a sitting  "every couple months. Pt shared his last alcohol use was 2 months ago.     Is Patient Required to Quit   Continued to use the substance despite being told the substance is affecting their health    History of problems at work, school or home due to substance use      Former Alcohol User Yes  Describe past alcohol use and date quit  Pt reported he used to \"pound it\" in his late teens/early 20s.     Has patient ever gone to CD treatment No  If yes, When, Where and What type of Program  Attends AA meetings    Sponsor  Do support people drink alcohol   If yes, describe support people's use  Is alcohol kept in the home   Does Patient need to sign a CD contract     Current Illegal / Unprescribed Drug User No  Type of Illegal Drug Used   Frequency  Pattern of Drug Use    Is Patient Required to Quit     Illegal / Unprescribed Drug #2  Type of Illegal Drug Used   Frequency  Pattern of Drug Use      Continue to use the substance despite being told the substance is affecting their health    History of problems at work, school or home due to substance use      Former Illegal / Unprescribed Drug User Yes  Describe past illegal drug use and date quit  Pt reported he tried \"a little bit of everything\" in his late teens/early 20s. Pt shared he tried marijuana, cocaine, and meth about half dozen times each.     Has patient ever gone to CD treatment   If yes, When, Where and What type of Program   Attends AA/NA  meetings    Is patient on a Methadone / Suboxone regiment   Do support people use illegal drugs   If yes, describe support people's use  Are illegal drugs kept in the home   Does Patient need to sign a CD contract     Illegal / Unprescribed Drug #2  Type of Illegal Drug Used   Frequency  Pattern of Drug Use    Prescription Drug Abuse:  No Has patient experienced feelings of addiction  No Has patient experienced symptoms of withdrawal  No Has patient experienced any side effects? e.g.  hallucinations or delusions    Does " "Patient Meet the Criteria for Alcohol Use Disorder No Diagnosis  Does Patient Meet OSOTC guidelines No  Does Patient Meet the Criteria for Illegal Drug Use Disorder No Diagnosis  Does Patient Meet OSOTC guidelines Yes    OSOTC Substance Relapse Risk Factors   DSM-5 Severity Factors:     IOP     LEGAL ISSUES  No Arrests  No Currently probation or parole No   half-way No  When   How long   Where       Citizenship:  Yes [place] Citizen   Green Card  Visa    Advance Directives: Yes   Pt reported his niece, Rhonda Browning, is listed.       Guardian:    LIZZIE MCKEON met with Pt and Pt's friend, Edgardo, for initial psychosocial assessment. Pt was pleasant and engaged. Pt reported he has Medicare Part A & B and Medical [place] Supplement for insurance. Pt demonstrated a good understanding of the risks of transplant and recovery process. Pt denied any financial concerns at this time. Pt reported the cause of his kidney diease is diabetes. Pt reported a goal of transplant is \"better quality of life, not going to dialysis, and no diet restrictions.\" Pt shared \"I'm not sure if I even want this done.\" Pt reported good compliance with medications, appointments, and dialysis treatments. Pt listed his friend, Edgardo, as primary support and his brother, Luis, as secondary support. Pt also listed his friend, Janny, as an alternate support. Pt reported all supports are adequate. Pt reported his mood as \"fine.\" Pt denied any MH history. Pt scored a 2 on the PHQ-9, indicating minimal clinical depression. Pt scored a 1 on the NATALIIA-7, indicating minimal clinical anxiety. Pt denied any current or past tobacco use. Pt reported he will have 1-2 drinks in a sitting every couple months. Pt shared he used to \"pound it\" in his late teens/early 20s. Pt denied any current illicit drug use. Pt reported he tried marijuana, cocaine, and meth about half dozen times each. Pt scored an 8 on the SIPAT, indicating Pt is a good candidate for " transplant. SW would recommend listing Pt while monitoring Pt's ambivalence towards transplant.     PLAN  SW to meet with Pt annually. SW to call and confirm secondary support.

## 2024-05-14 NOTE — PATIENT INSTRUCTIONS
Thank You for coming to [facility].  You are currently in evaluation for kidney transplant.  In order to be eligible to be placed on the wait list you must complete certain tests and appointments.  The following tests/appointments will be scheduled for you:    Chest x-ray  CT cardiac Score  CT abdomen and pelvis  Cardiac Stress MRI  6 minute walk test    Financial Consult   Cardiology Consult    Please complete the following testing with your primary care doctor:    Colonoscopy    Please call [phone] with any questions or if you need assistance.    Alicia EIDN, RN Transplant Coordinator

## 2024-05-14 NOTE — PROGRESS NOTES
Subjective   Marco A Craig is a 68 y.o. male who presents for kidney transplant evaluation visit.     Patient is a 68-year-old man with past medical history significant for longstanding diabetes he has been on insulin for more than 20 years and before that oral medication.  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  He has a left arm graft for dialysis.  His medical history is significant for paroxysmal SVT for which he has symptoms for.  He underwent a attempted ablation a few months ago at the [facility] at which time they cannot induce SVT to ablated.  He is on a beta-blocker.  In addition he has hypertension hyperlipidemia OPAL.    In terms of his diabetes he takes on average 25 units/day of insulin.    Surgical history includes an umbilical hernia repair with mesh more than 20 years ago.    He denies chest pain or shortness of breath.  But he does report when he had SVT he is symptomatic.  He has had 3-4 episodes of SVT..  The timing of these episodes seem to be random some are on dialysis some are not.    Review of Systems   Constitutional:  Negative for chills and fever.   HENT: Negative.  Negative for congestion.    Eyes: Negative.    Respiratory:  Negative for cough and shortness of breath.    Cardiovascular:  Negative for chest pain.   Gastrointestinal:  Negative for abdominal distention, abdominal pain, constipation and diarrhea.   Endocrine: Negative for cold intolerance and heat intolerance.   Genitourinary:  Negative for dysuria, frequency, hematuria and urgency.   Musculoskeletal:  Negative for arthralgias.   Skin:  Negative for color change.   Allergic/Immunologic: Negative for environmental allergies.   Neurological:  Negative for dizziness, weakness and light-headedness.   Hematological:  Negative for adenopathy.   Psychiatric/Behavioral:  Negative for agitation, confusion and hallucinations.         Objective   Vitals:    05/14/24 1302  "  BP: 114/70   Pulse: 80   Temp: 36.4 °C (97.5 °F)   SpO2: 95%       Physical Exam  Constitutional:       Appearance: Normal appearance.   HENT:      Head: Normocephalic and atraumatic.      Nose: Nose normal.   Eyes:      Pupils: Pupils are equal, round, and reactive to light.   Cardiovascular:      Rate and Rhythm: Normal rate.   Pulmonary:      Effort: Pulmonary effort is normal. No respiratory distress.   Abdominal:      General: There is no distension.      Palpations: Abdomen is soft. There is no mass.   Musculoskeletal:         General: No swelling. Normal range of motion.      Cervical back: Normal range of motion.   Skin:     General: Skin is warm and dry.   Neurological:      General: No focal deficit present.      Mental Status: He is alert and oriented to person, place, and time.   Psychiatric:         Mood and Affect: Mood normal.         Behavior: Behavior normal.        Lab Review    Blood Type: No results found for: \"ABORH\"  Lab Results   Component Value Date    CREATININE 4.12 (H) 2024    K 3.2 (L) 2024    GLUCOSE 151 (H) 2024    HCT 30.9 (L) 2024    WBC 6.3 2024     2024    CALCIUM 8.7 2024     Lab Results   Component Value Date    WBC 6.3 2024    HGB 10.2 (L) 2024    HCT 30.9 (L) 2024    MCV 97 2024     2024     Lab Results   Component Value Date    GLUCOSE 151 (H) 2024    CALCIUM 8.7 2024     2024    K 3.2 (L) 2024    CO2 33 (H) 2024    CL 95 (L) 2024    BUN 34 (H) 2024    CREATININE 4.12 (H) 2024         Kidney Transplant Education and Discussion    I had a discussion with this patient regarding 1 year graft and patient survival statistics following renal transplantation for both living and  donor allograft recipients. This data included [facility] data compared to National data readily available for review on https://www.SRTR.org. The " patient also had attended the kidney transplant education class provided by the transplant institute.    The difference between allograft function was discussed comparing living donor, KDPI 0-85%, and >85% kidneys.    Further discussion included:  -The transplant selection committee process.  -The need for lifelong immunosuppressive therapy, and the side effects of these medications including the risk of infections, cancer, and lymphoma.  -The wait list time approximately is 5 years or more for  donor transplants and the statistical superiority of a living donor.    - Using identified donors with risk criteria for transmission of infection  -Potential transmission of infectious disease from any  donors, as well as living donors.   -The possibility of transmission of tumors and infections via the transplanted organ.  -The inability to completely test for all potential harmful tumors or infectious agents.  -The possibility of listing at multiple locations.    Surgical complications including need for reoperation(s) including but not limited to:  -Bleeding.  -Repair of leaks.  -Control of infection.  -Possible kidney transplant removal.    The medical complications including but not limited to:  -Death.  -Cardiac.  -Pulmonary.  -Infectious.  -Neurologic.  -Other Complications.    We also discussed how the kidney transplant could function:  -Non-function and possible kidney transplant removal within the first 3 to 6 months.  -Delayed graft function (dialysis needed after transplant).  -The potential of recurrence of kidney disease leading to kidney transplant graft loss.    Assessment/Plan    Diagnoses:   1. Type 2 diabetes mellitus with chronic kidney disease on chronic dialysis, with long-term current use of insulin (Multi)    2. Pre-transplant evaluation for kidney transplant      Marco A Craig is a reasonable candidate for kidney transplantation.  He had a history of diabetes with the kidney  disease on dialysis for the past 2 years.  He has paroxysmal SVT.    I think he has significant risk factor for coronary artery disease and cardiac related issues.  He has reported undergo a heart cath while they were treating his SVT at [facility] however we could not find the records from care everywhere.  Either way he will undergo CT coronary score and stress test and be referred to our cardiologist for further assessment.

## 2024-05-15 ENCOUNTER — DOCUMENTATION (OUTPATIENT)
Dept: TRANSPLANT | Facility: HOSPITAL | Age: 69
End: 2024-05-15
Payer: MEDICARE

## 2024-05-15 ENCOUNTER — LAB REQUISITION (OUTPATIENT)
Dept: LAB | Facility: CLINIC | Age: 69
End: 2024-05-15
Payer: MEDICARE

## 2024-05-15 DIAGNOSIS — Z01.818 PRE-TRANSPLANT EVALUATION FOR KIDNEY TRANSPLANT: ICD-10-CM

## 2024-05-15 DIAGNOSIS — Z01.818 ENCOUNTER FOR OTHER PREPROCEDURAL EXAMINATION: ICD-10-CM

## 2024-05-15 LAB
FLOW AUTOCROSSMATCH: NORMAL
FLOW AUTOCROSSMATCH: NORMAL
HCYS SERPL-SCNC: 20.45 UMOL/L (ref 5–13.9)
HLA CLASS I AB SCREEN,FC: NORMAL
HLA CLASS II AB SCREEN,FC: NORMAL
HLA CLS I TYP PNL BLD/T DONR HIGH RES: NORMAL
HLA RESULTS: NORMAL
HLA RESULTS: NORMAL
HLA-DP2 QL: NORMAL
HLA-DQB1 HIGH RES: NORMAL
HLA-DRB1 HIGH RES: NORMAL

## 2024-05-15 RX ORDER — CARVEDILOL 6.25 MG/1
6.25 TABLET ORAL
COMMUNITY

## 2024-05-15 RX ORDER — INSULIN GLARGINE 100 [IU]/ML
20 INJECTION, SOLUTION SUBCUTANEOUS NIGHTLY
COMMUNITY

## 2024-05-15 NOTE — PROGRESS NOTES
Eval Clinic Note:  Patient attended evaluation appts on 5/14/2024 with Dr. Horta and Dr. Troy.  Medications and allergies reviewed with patient.  Patient presented to appointment with his friend, Edgardo.  Patient ambulated independently.    History:  Patient has a history of DMT2, CHF, HTN, paroxysmal SVT  Dialysis: Hemodialysis M-W-F, ACCESS unknown, since 2/2022.  Testing completed recently: Echocardiogram  Testing needed for evaluation: chest x-ray, CT cardiac score, Cardiac stress MRI, CT abdomen/pelvis, and colonoscopy  Listing Consent:  DCD  Comments:  Provided patient with my contact info for questions and concerns.      LISTING EDUCATION    Patient educated regarding the following prior to placement on the transplant waiting list:   The patient?s medical condition, prognosis, and treatment plan.   The expectations and patient responsibilities while on the waiting list, including:   Keeping the transplant center informed of any changes in contact information or insurance coverage   Notifying the transplant center of any changes in medical status   Required testing and/or re-evaluation appointments while awaiting transplant   An overview of the surgical procedure, including potential risks and alternatives.   Information regarding what to expect during the inpatient admission and recovery period.   A discussion regarding organ offers and types of potential donors, including potential risks that may be associated with specific types of donors that could affect the success of the transplant or the health of the patient.  The right to refuse transplantation.     Patient was given the opportunity to have questions answered. Patient was provided a copy of the informed consent for transplant listing.    Education provided by:  Transplant Coordinator: Alicia Cox RN  Transplant Physician: Dr. Troy    Signed listing informed consent received? YES  Patient agrees to be listed for the following:  KDPI >  85% []  Donors After Circulatory Death (DCD) [x]  Donors with a Positive Core Antibody for Hepatitis B []  Donors with Hepatitis C Virus to recipients with hepatitis C []  Donors with Hepatitis C Virus to Negative hepatitis C recipients []    Patient will be discussed at an upcoming selection committee to determine eligibility to be placed on the [facility] waiting list.

## 2024-05-16 LAB
AMPHETAMINES SERPL QL SCN: NEGATIVE NG/ML
ANNOTATION COMMENT IMP: NORMAL
BARBITURATES SERPL QL SCN: NEGATIVE NG/ML
BENZODIAZ SERPL QL SCN: NEGATIVE NG/ML
BUPRENORPHINE SERPL-MCNC: NEGATIVE NG/ML
CANNABINOIDS SERPL QL SCN: NEGATIVE NG/ML
COCAINE SERPL QL SCN: NEGATIVE NG/ML
METHADONE SERPL QL SCN: NEGATIVE NG/ML
METHAMPHET SERPL QL: NEGATIVE NG/ML
NIL(NEG) CONTROL SPOT COUNT: NORMAL
OPIATES SERPL QL SCN: NEGATIVE NG/ML
OXYCODONE SERPL QL: NEGATIVE NG/ML
PANEL A SPOT COUNT: 0
PANEL B SPOT COUNT: 0
PCP SERPL QL SCN: NEGATIVE NG/ML
POS CONTROL SPOT COUNT: NORMAL
PSA FREE MFR SERPL: 19 %
PSA FREE SERPL-MCNC: 0.7 NG/ML
PSA SERPL IA-MCNC: 3.6 NG/ML (ref 0–4)
T-SPOT. TB INTERPRETATION: NEGATIVE

## 2024-05-16 ASSESSMENT — ANXIETY QUESTIONNAIRES
7. FEELING AFRAID AS IF SOMETHING AWFUL MIGHT HAPPEN: NOT AT ALL
GAD7 TOTAL SCORE: 1
6. BECOMING EASILY ANNOYED OR IRRITABLE: NOT AT ALL
2. NOT BEING ABLE TO STOP OR CONTROL WORRYING: NOT AT ALL
3. WORRYING TOO MUCH ABOUT DIFFERENT THINGS: NOT AT ALL
5. BEING SO RESTLESS THAT IT IS HARD TO SIT STILL: NOT AT ALL
1. FEELING NERVOUS, ANXIOUS, OR ON EDGE: SEVERAL DAYS
4. TROUBLE RELAXING: NOT AT ALL

## 2024-05-16 ASSESSMENT — PATIENT HEALTH QUESTIONNAIRE - PHQ9
6. FEELING BAD ABOUT YOURSELF - OR THAT YOU ARE A FAILURE OR HAVE LET YOURSELF OR YOUR FAMILY DOWN: NOT AT ALL
SUM OF ALL RESPONSES TO PHQ9 QUESTIONS 1 & 2: 0
2. FEELING DOWN, DEPRESSED OR HOPELESS: NOT AT ALL
1. LITTLE INTEREST OR PLEASURE IN DOING THINGS: NOT AT ALL
8. MOVING OR SPEAKING SO SLOWLY THAT OTHER PEOPLE COULD HAVE NOTICED. OR THE OPPOSITE, BEING SO FIGETY OR RESTLESS THAT YOU HAVE BEEN MOVING AROUND A LOT MORE THAN USUAL: NOT AT ALL
4. FEELING TIRED OR HAVING LITTLE ENERGY: SEVERAL DAYS
3. TROUBLE FALLING OR STAYING ASLEEP OR SLEEPING TOO MUCH: NOT AT ALL
SUM OF ALL RESPONSES TO PHQ QUESTIONS 1-9: 2
5. POOR APPETITE OR OVEREATING: NOT AT ALL
7. TROUBLE CONCENTRATING ON THINGS, SUCH AS READING THE NEWSPAPER OR WATCHING TELEVISION: SEVERAL DAYS
9. THOUGHTS THAT YOU WOULD BE BETTER OFF DEAD, OR OF HURTING YOURSELF: NOT AT ALL

## 2024-05-17 LAB
HLA CLASS I AB SCREEN,FC: NORMAL
HLA CLASS II AB SCREEN,FC: NORMAL
HLA RESULTS: NORMAL

## 2024-05-18 LAB
COTININE SERPL-MCNC: <5 NG/ML
NICOTINE SERPL-MCNC: <5 NG/ML

## 2024-05-23 ENCOUNTER — DOCUMENTATION (OUTPATIENT)
Dept: TRANSPLANT | Facility: HOSPITAL | Age: 69
End: 2024-05-23
Payer: MEDICARE

## 2024-05-23 NOTE — PROGRESS NOTES
SW spoke with Pt's brother, Luis, via telephone call to confirm commitment as secondary support on 5/22/2024. Pt's brother reported they are retired. Pt's brother reported they have no other caregiver responsibilities. Pt's brother denied any medical, psychological, or physical limitations. Pt's brother reported they drive and have a working vehicle. Pt's brother shared they live in [place] but will be able to travel to [place] to help Pt. Pt's brother reported they are committed to helping Pt through transplant process.    Plan: SW to remain available.

## 2024-05-25 LAB
HLA CLS I TYP PNL BLD/T DONR HIGH RES: NORMAL
HLA RESULTS: NORMAL
HLA-DP2 QL: NORMAL
HLA-DQB1 HIGH RES: NORMAL
HLA-DRB1 HIGH RES: NORMAL

## 2024-05-28 ENCOUNTER — DOCUMENTATION (OUTPATIENT)
Dept: TRANSPLANT | Facility: HOSPITAL | Age: 69
End: 2024-05-28
Payer: MEDICARE

## 2024-05-28 ENCOUNTER — OTHER (OUTPATIENT)
Dept: TRANSPLANT | Facility: HOSPITAL | Age: 69
End: 2024-05-28
Payer: MEDICARE

## 2024-05-28 NOTE — PROGRESS NOTES
Spoke to patient today via the phone re his Medicare & MMO Supp benefits.  He is aware he has benefits for a LD.  He is aware his Medicare part B, not D, will cover his immunos.  Discussed Medicare part D coverage gap & patient can afford it.  Patient stated he was probably going to go with [facility] & did not understand how [facility] had his insurance information.  I explained his dialysis center may have provided it & he stated he was aware they had made a referral.

## 2024-05-29 ENCOUNTER — TELEPHONE (OUTPATIENT)
Dept: TRANSPLANT | Facility: HOSPITAL | Age: 69
End: 2024-05-29
Payer: MEDICARE

## 2024-05-29 NOTE — TELEPHONE ENCOUNTER
The patient was called to get appointments scheduled for his kidney transplant evaluation.  After a discussion with the patient, the patient decided that because he gets all his care at [facility], he would like to close his evaluation here and pursue transplant listing at [facility].

## 2024-06-04 ENCOUNTER — APPOINTMENT (OUTPATIENT)
Dept: RESPIRATORY THERAPY | Facility: HOSPITAL | Age: 69
End: 2024-06-04
Payer: MEDICARE

## 2024-06-10 ENCOUNTER — COMMITTEE REVIEW (OUTPATIENT)
Dept: TRANSPLANT | Facility: HOSPITAL | Age: 69
End: 2024-06-10
Payer: MEDICARE

## 2024-06-10 NOTE — LETTER
Lizet 10, 2024    Marco A Craig  [address]  [place] [address]      Dear Mr. Craig:    Our multi-disciplinary transplant team completed a review of your medical records on 6/6/2024.  I regret to inform you that the decision was not to proceed with placing you on the [facility] ([facility]) waiting list for a Kidney transplant.    Our transplant program consists of surgeons and medical doctors who provide coverage 365 days a year, 24 hours a day.     If you have any questions or concerns regarding your insurance coverage or billing issues, a  is available to speak with you.     It is important to keep us updated of any major changes in your medical condition, contact information and health insurance coverage.     Please don't hesitate to contact us at Dept: [phone] with any questions or concerns. We look forward to working with you through this process.      Sincerely,      Alicia Cox RN          The [facility] Toll-free Patient Services Line:  Your Resource for Organ Transplant Information    If you have a question regarding your own medical care, you always should call your transplant hospital first. However, for general organ transplant-related information, you should call the [facility] ([facility]) toll-free patient services line at [phone].  Anyone, including potential transplant candidates, candidates, recipients, family members, friends, living donors, and donor family members, can call this number to:    Talk about organ donation, living donation, the transplant process, the donation process, and transplant policies.  Get a free patient information kit with helpful booklets, waiting list and transplant information, and a list of all transplant hospitals.  Ask questions about the Organ Procurement and Transplantation Network (OPTN) web site (http://optn.transplant.hrsa.gov/), the [facility] Web site (http://unos.org/), or the [facility]OS web  site for living donors and transplant recipients. (http://www.transplantliving.org/).  Learn how [facility] and the OPTN can help you.  Talk about any concerns that you may have with a transplant hospital.    [employer] is a not-for-profit organization that provides the administrative services for the national OPTN under federal contract to the Health Resources and Services Administration (HRSA), an agency under the U.S. Department of Health and Human Services (HHS).    [facility] and the OPTN are responsible for:    Providing educational material for patients, the public, and professionals.  Raising awareness of the need for donated organs and tissue.  Writing organ transplant policy with help from transplant professionals, transplant patients, transplant candidates, donor families, living donors, and the public.  Coordinating organ procurement, matching, and placement.  Collecting information about every organ transplant and donation that occurs in the [place].    Remember, you should contact your transplant hospital directly if you have questions or concerns about your own medical care including medical records, work-up progress, and test results.    [facility] is not your transplant hospital, and staff at [facility] will not be able to transfer you to your transplant hospital, so keep your transplant hospital’s phone number handy.    However, while you research your transplant needs and learn as much as you can about transplantation and donation, we welcome your call to our toll-free patient services line at [phone].      [facility] PIL Final Rev 1-

## 2024-06-10 NOTE — COMMITTEE REVIEW
Presentation for Listing Evaluation Date: 5/14/2024  Committee Review Date: 6/6/2024    Organ being evaluated for: Kidney    Transplant Phase: Evaluation  Transplant Status: Active    Referring Physician:    Primary Diagnosis: DMT2  Secondary Diagnosis:     Committee Review Decision: Declined      Committee Discussion Details: Patient has all care at [facility] and has decided to pursue transplant there.  Close evaluation per patient choice.  The candidate's evaluation was presented and discussed at the Kidney  Multidisciplinary Selection Conference. After review of the candidate's diagnosis and the evaluations of the multidisciplinary team members, it was the consensus of the Selection Committee that the candidate does not meet Kidney Selection Criteria and is Declined for Kidney transplant.    Physician: No concerns  Surgeon: No concerns  Social Work: No concerns  Dietician: No concerns  Pharmacist: No concerns  Transplant Coordinator: No concerns

## 2024-06-11 ENCOUNTER — DOCUMENTATION (OUTPATIENT)
Dept: TRANSPLANT | Facility: HOSPITAL | Age: 69
End: 2024-06-11
Payer: MEDICARE

## 2024-06-23 ENCOUNTER — APPOINTMENT (OUTPATIENT)
Dept: RADIOLOGY | Facility: CLINIC | Age: 69
End: 2024-06-23
Payer: MEDICARE

## 2024-08-06 ENCOUNTER — APPOINTMENT (OUTPATIENT)
Dept: RADIOLOGY | Facility: HOSPITAL | Age: 69
End: 2024-08-06
Payer: MEDICARE

## 2024-08-15 ENCOUNTER — APPOINTMENT (OUTPATIENT)
Dept: CARDIOLOGY | Facility: CLINIC | Age: 69
End: 2024-08-15
Payer: MEDICARE